# Patient Record
Sex: MALE | Race: WHITE | Employment: OTHER | URBAN - METROPOLITAN AREA
[De-identification: names, ages, dates, MRNs, and addresses within clinical notes are randomized per-mention and may not be internally consistent; named-entity substitution may affect disease eponyms.]

---

## 2018-10-10 ENCOUNTER — OFFICE VISIT (OUTPATIENT)
Dept: SURGERY | Age: 73
End: 2018-10-10

## 2018-10-10 VITALS
DIASTOLIC BLOOD PRESSURE: 71 MMHG | OXYGEN SATURATION: 96 % | TEMPERATURE: 97.3 F | SYSTOLIC BLOOD PRESSURE: 138 MMHG | RESPIRATION RATE: 16 BRPM | HEART RATE: 79 BPM | BODY MASS INDEX: 24.45 KG/M2 | HEIGHT: 69 IN | WEIGHT: 165.1 LBS

## 2018-10-10 DIAGNOSIS — M62.08 RECTUS DIASTASIS: ICD-10-CM

## 2018-10-10 DIAGNOSIS — I10 ESSENTIAL HYPERTENSION: ICD-10-CM

## 2018-10-10 DIAGNOSIS — K42.9 UMBILICAL HERNIA WITHOUT OBSTRUCTION AND WITHOUT GANGRENE: ICD-10-CM

## 2018-10-10 DIAGNOSIS — K43.9 VENTRAL HERNIA WITHOUT OBSTRUCTION OR GANGRENE: Primary | ICD-10-CM

## 2018-10-10 RX ORDER — LISINOPRIL 20 MG/1
TABLET ORAL
Refills: 1 | COMMUNITY
Start: 2018-09-19

## 2018-10-10 NOTE — MR AVS SNAPSHOT
[address], [address] [address] 
[phone] Patient: El Nagel MRN: [id number] Visit Information Date & Time Provider Department Dept. Phone Encounter #  
 10/10/2018  3:00 PM Kashif Slade MD [facility] [id number] Upcoming Health Maintenance Date Due Hepatitis C Screening 1945 DTaP/Tdap/Td series (1 - Tdap) 4/30/1967 Shingrix Vaccine Age 50> (1 of 2) 4/30/1996 FOBT Q 1 YEAR AGE 50-75 4/30/1996 GLAUCOMA SCREENING Q2Y 4/30/2011 Pneumococcal 65+ Low/Medium Risk (1 of 2 - PCV13) 4/30/2011 Influenza Age 5 to Adult 8/1/2018 MEDICARE YEARLY EXAM 10/3/2018 Allergies as of 10/10/2018  Review Complete On: 10/10/2018 By: Kashif Slade MD  
  
 Severity Noted Reaction Type Reactions Oxycodone  10/10/2018    Other (comments) Changes mental status,  Pt will not take. Current Immunizations  Never Reviewed No immunizations on file. Not reviewed this visit Vitals BP Pulse Temp Resp Height(growth percentile) Weight(growth percentile) 138/71 (BP 1 Location: Left arm, BP Patient Position: Sitting) 79 97.3 °F (36.3 °C) (Oral) 16 5' 9\" (1.753 m) 165 lb 1.6 oz (74.9 kg) SpO2 BMI Smoking Status 96% 24.38 kg/m2 Former Smoker Vitals History BMI and BSA Data Body Mass Index Body Surface Area  
 24.38 kg/m 2 1.91 m 2 Your Updated Medication List  
  
   
This list is accurate as of 10/10/18  4:50 PM.  Always use your most recent med list.  
  
  
  
  
 GLUCOSAMINE RELIEF 1,000 mg Tab Generic drug:  glucosamine Take 3,000 mg by mouth daily. lisinopril 20 mg tablet Commonly known as:  Ashley PENDLETON 1 T PO QD  
  
 lisinopril-hydroCHLOROthiazide 10-12.5 mg per tablet Commonly known as:  Luanne Jain  
 Take 1 Tab by mouth daily. Indications: HYPERTENSION  
  
 loratadine 10 mg Cap Take 10 mg by mouth daily. Indications: ALLERGIC RHINITIS  
  
 naproxen sodium 220 mg tablet Commonly known as:  NAPROSYN Take 220 mg by mouth two (2) times daily (with meals). Indications: PAIN  
  
 saw palmetto 160 mg Cap Take 160 mg by mouth two (2) times a day. therapeutic multivitamin tablet Commonly known as:  [facility] Take 1 Tab by mouth daily. Indications: VITAMIN DEFICIENCY Introducing [facility] SERVICES! Ugo Lees introduces [employer] patient portal. Now you can access parts of your medical record, email your doctor's office, and request medication refills online. 1. In your internet browser, go to [email or web address] 2. Click on the First Time User? Click Here link in the Sign In box. You will see the New Member Sign Up page. 3. Enter your [employer] Access Code exactly as it appears below. You will not need to use this code after youve completed the sign-up process. If you do not sign up before the expiration date, you must request a new code. · [employer] Access Code: [email or web address] Expires: 1/8/2019  3:17 PM 
 
4. Enter the last four digits of your Social Security Number (xxxx) and Date of Birth (mm/dd/yyyy) as indicated and click Submit. You will be taken to the next sign-up page. 5. Create a [employer] ID. This will be your [employer] login ID and cannot be changed, so think of one that is secure and easy to remember. 6. Create a [employer] password. You can change your password at any time. 7. Enter your Password Reset Question and Answer. This can be used at a later time if you forget your password. 8. Enter your e-mail address. You will receive e-mail notification when new information is available in [address] 9. Click Sign Up. You can now view and download portions of your medical record.  
10. Click the Download Summary menu link to download a portable copy of your medical information. If you have questions, please visit the Frequently Asked Questions section of the [employer] website. Remember, [employer] is NOT to be used for urgent needs. For medical emergencies, dial 911. Now available from your iPhone and Android! Please provide this summary of care documentation to your next provider. Your primary care clinician is listed as EREN Webster. If you have any questions after today's visit, please call [phone].
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
## 2018-10-10 NOTE — PROGRESS NOTES
Chief Complaint Patient presents with  Possible Hernia Seen at the request of Dr. Miranda Tolentino, eval hernia. 1. Have you been to the ER, urgent care clinic since your last visit? Hospitalized since your last visit? No 
 
2. Have you seen or consulted any other health care providers outside of the [address] [place] [address] since your last visit? Include any pap smears or colon screening. Chiropractor.

## 2018-12-07 ENCOUNTER — OFFICE VISIT (OUTPATIENT)
Dept: SURGERY | Age: 73
End: 2018-12-07

## 2018-12-07 VITALS
RESPIRATION RATE: 16 BRPM | TEMPERATURE: 96.5 F | DIASTOLIC BLOOD PRESSURE: 85 MMHG | SYSTOLIC BLOOD PRESSURE: 147 MMHG | WEIGHT: 178 LBS | HEIGHT: 69 IN | OXYGEN SATURATION: 97 % | HEART RATE: 81 BPM | BODY MASS INDEX: 26.36 KG/M2

## 2018-12-07 DIAGNOSIS — M62.08 RECTUS DIASTASIS: ICD-10-CM

## 2018-12-07 DIAGNOSIS — K42.9 UMBILICAL HERNIA WITHOUT OBSTRUCTION AND WITHOUT GANGRENE: ICD-10-CM

## 2018-12-07 DIAGNOSIS — K43.9 VENTRAL HERNIA WITHOUT OBSTRUCTION OR GANGRENE: Primary | ICD-10-CM

## 2018-12-07 RX ORDER — LANOLIN ALCOHOL/MO/W.PET/CERES
400 CREAM (GRAM) TOPICAL DAILY
COMMUNITY

## 2018-12-07 NOTE — PATIENT INSTRUCTIONS
Surgery Instruction Sheet You have been scheduled for surgery on 01/09/2019 at 8:15am at [facility]. Please report to the Surgery Center at 6:15am, this is approximately 2 hours prior to your surgery time. The Surgery Center is located on the [address] [place] [address] Street side of the hospital, just next to the Emergency Room. Reserved parking is available and  parking if lot is full. You will need to have a Pre-op Visit prior to your surgery. Report to the Surgery Center on 01/02/2019 at 9:00am.  Bring a list of medications and your insurance cards with you. You may eat/drink prior to this visit. Call your physician immediately if you notice a change in your health between the time you saw your physician and the day of surgery. If you take a blood thinner, please let us know. Call your ordering Doctor to make sure you can stop taking it prior to your surgery. STOP YOUR ASPIRIN 10 DAYS PRIOR TO SURGERY. DO NOT TAKE  IBUPROFEN, ADVIL, MOTRIN, ALEVE, EXCEDRIN, BC POWDER, GOODIES, FISH OIL OR ANY MEDICATION CONTAINING ASPIRIN 10 DAYS PRIOR TO YOUR SURGERY. MAY TAKE TYLENOL. Eat a light dinner the evening before your surgery. DO NOT EAT OR DRINK ANYTHING AFTER MIDNIGHT THE NIGHT BEFORE YOUR SURGERY. This includes water, chewing gum, lifesavers, etc.  The Pre op nurse will check with you about any medication that you may need to take the morning of surgery. Shower with a new bar of anti-bacterial soap (Dial, Safeguard) or solution given to you by Pre-op, the night before surgery. Do not use lotion, powder, deodorant on the skin after showering.   Wear loose, comfortable clothing the day of surgery and bring a container to store your contacts, eyeglasses, dentures, hearing aid, etc.  Do not bring money, valuables, jewelry, etc. to the hospital.   
 
If you are having outpatient surgery, someone must come with you the morning of surgery to drive you home. You can not drive for 24 hours after any anesthesia. Sometimes it is necessary to stay overnight and leave the next morning. This is still considered outpatient for most insurance deductibles. Someone will still need to drive you home. If you have questions or concerns, please feel free to call Dr Jana Perez at [phone]. If you need to cancel your surgery, please call as soon as possible.
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
## 2018-12-07 NOTE — PROGRESS NOTES
Chief Complaint Patient presents with  Ventral Hernia Patient here today to discuss surgery. 1. Have you been to the ER, urgent care clinic since your last visit? Hospitalized since your last visit? No 
 
2. Have you seen or consulted any other health care providers outside of the [address] [place] [address] since your last visit? Include any pap smears or colon screening.  No

## 2018-12-27 NOTE — PERIOP NOTES
Preventing Infections Before and After  Your Surgery    IMPORTANT INSTRUCTIONS    Please read and follow these instructions carefully. If you are unable to comply with the below instructions your procedure will be cancelled. Every Night for Three (3) nights before your surgery:  1. Shower with an antibacterial soap, such as Dial, or the soap provided at your preassessment appointment. A shower is better than a bath for cleaning your skin. 2. If needed, ask someone to help you reach all areas of your body. Dont forget to clean your belly button with every shower. The night before your surgery: If you lose your Hibiclens please contact surgery center or you can purchase it at a local pharmacy  1. On the night before your surgery, shower with an antibacterial soap, such as Dial, or the soap provided at your preassessment appointment. 2. With one packet of Hibiclens in hand, turn water off.  3. Apply Hibiclens antiseptic skin cleanser with a clean, freshly washed washcloth. ? Gently apply to your body from chin to toes (except the genital area) and especially the area(s) where your incision(s) will be. ? Leave Hibiclens on your skin for at least 20 seconds. CAUTION: If needed, Hibiclens may be used to clean the folds of skin of the legs (such as in the area of the groin) and on your buttocks and hips. However, do not use Hibiclens above the neck or in the genital area (your bottom) or put inside any area of your body. 4. Turn the water back on and rinse. 5. Dry gently with a clean, freshly washed towel. 6. After your shower, do not use any powder, deodorant, perfumes or lotion. 7. Use clean, freshly washed towels and washcloths every time you shower. 8. Wear clean, freshly washed pajamas to bed the night before surgery. 9. Sleep on clean, freshly washed sheets. 10. Do not allow pets to sleep in your bed with you. The Morning of your surgery:  1.  Shower again thoroughly with an antibacterial soap, such as Dial or the soap provided at your preassessment appointment. If needed, ask someone for help to reach all areas of your body. Dont forget to clean your belly button! Rinse. 2. Dry gently with a clean, freshly washed towel. 3. After your shower, do not use any powder, deodorant, perfumes or lotion prior to surgery. 4. Put on clean, freshly washed clothing. Tips to help prevent infections after your surgery:  1. Protect your surgical wound from germs:  ? Hand washing is the most important thing you and your caregivers can do to prevent infections. ? Keep your bandage clean and dry! ? Do not touch your surgical wound. 2. Use clean, freshly washed towels and washcloths every time you shower; do not share bath linens with others. 3. Until your surgical wound is healed, wear clothing and sleep on bed linens each day that are clean and freshly washed. 4. Do not allow pets to sleep in your bed with you or touch your surgical wound. 5. Do not smoke  smoking delays wound healing. This may be a good time to stop smoking. 6. If you have diabetes, it is important for you to manage your blood sugar levels properly before your surgery as well as after your surgery. Poorly managed blood sugar levels slow down wound healing and prevent you from healing completely. If you lose your Hibiclens, please call the [facility], or it is available for purchase at your pharmacy.                ___________________      ___________________      ________________  (Signature of Patient)          (Witness)                   (Date and Time)

## 2018-12-27 NOTE — PERIOP NOTES
[facility]  Ambulatory Surgery Unit  Pre-operative Instructions    Surgery/Procedure Date  Thursday, January 3, 2019            Tentative Arrival Time 0700      1. On the day of your surgery/procedure, please report to the Ambulatory Surgery Unit Registration Desk and sign in at your designated time. The Ambulatory Surgery Unit is located in [facility] on the [facility] side of the [facility] across from the [facility]. Please have all of your health insurance cards and a photo ID. 2. You must have someone with you to drive you home, as you should not drive a car for 24 hours following anesthesia. Please make arrangements for a responsible adult friend or family member to stay with you for at least the first 24 hours after your surgery. 3. Do not have anything to eat or drink (including water, gum, mints, coffee, juice) after 11:59 PM, Wednesday. This may not apply to medications prescribed by your physician. (Please note below the special instructions with medications to take the morning of surgery, if applicable.)    4. We recommend you do not drink any alcoholic beverages for 24 hours before and after your surgery. 5. Contact your surgeons office for instructions on the following medications: non-steroidal anti-inflammatory drugs (i.e. Advil, Aleve), vitamins, and supplements. (Some surgeons will want you to stop these medications prior to surgery and others may allow you to take them)   **If you are currently taking Plavix, Coumadin, Aspirin and/or other blood-thinning agents, contact your surgeon for instructions. ** Your surgeon will partner with the physician prescribing these medications to determine if it is safe to stop or if you need to continue taking. Please do not stop taking these medications without instructions from your surgeon. 6. See additional paperwork for washing instructions. 7. Wear comfortable clothes. Wear glasses instead of contacts.  Do not bring any jewelry or money (other than copays or fees as instructed). Do not wear make-up, particularly mascara, the morning of your surgery. Do not wear nail polish, particularly if you are having foot /hand surgery. Wear your hair loose or down, no ponytails, buns, norma pins or clips. All body piercings must be removed. 8. You should understand that if you do not follow these instructions your surgery may be cancelled. If your physical condition changes (i.e. fever, cold or flu) please contact your surgeon as soon as possible. 9. It is important that you be on time. If a situation occurs where you may be late, or if you have any questions or problems, please call [phone].    10. Your surgery time may be subject to change. You will receive a phone call the day prior to surgery to confirm your arrival time. 11. Pediatric patients: please bring a change of clothes, diapers, bottle/sippy cup, pacifier, etc.      Special Instructions: Take all medications and inhalers, as prescribed, on the morning of surgery with a sip of water. I understand a pre-operative phone call will be made to verify my surgery time. In the event that I am not available, I give permission for a message to be left on my answering service and/or with another person?       yes    Preop instructions reviewed  Pt verbalized understanding.      ___________________      ___________________      ________________  (Signature of Patient)          (Witness)                   (Date and Time)

## 2018-12-27 NOTE — PERIOP NOTES
Called Dr. Sandi Mccray' office and lm that pt has a history of MRSA. Pt also has a cold/laryngitis, no fever and not coughing up anything. I told him if it progresses or is not getting better he needs to reach out to us and reschedule. He verbalized understanding.

## 2019-01-02 ENCOUNTER — HOSPITAL ENCOUNTER (OUTPATIENT)
Dept: PREADMISSION TESTING | Age: 74
Discharge: HOME | End: 2019-01-02
Attending: SURGERY
Payer: MEDICARE

## 2019-01-02 ENCOUNTER — ANESTHESIA EVENT (OUTPATIENT)
Dept: SURGERY | Age: 74
End: 2019-01-02
Payer: MEDICARE

## 2019-01-02 VITALS
OXYGEN SATURATION: 99 % | SYSTOLIC BLOOD PRESSURE: 145 MMHG | TEMPERATURE: 97.9 F | HEIGHT: 69 IN | BODY MASS INDEX: 25.44 KG/M2 | DIASTOLIC BLOOD PRESSURE: 76 MMHG | RESPIRATION RATE: 20 BRPM | WEIGHT: 171.74 LBS

## 2019-01-02 LAB
ANION GAP SERPL CALC-SCNC: 7 MMOL/L (ref 5–15)
APPEARANCE UR: CLEAR
ATRIAL RATE: 80 BPM
BACTERIA URNS QL MICRO: ABNORMAL /HPF
BILIRUB UR QL: NEGATIVE
BUN SERPL-MCNC: 15 MG/DL (ref 6–20)
BUN/CREAT SERPL: 12 (ref 12–20)
CALCIUM SERPL-MCNC: 9 MG/DL (ref 8.5–10.1)
CALCULATED P AXIS, ECG09: 71 DEGREES
CALCULATED R AXIS, ECG10: 8 DEGREES
CALCULATED T AXIS, ECG11: 71 DEGREES
CHLORIDE SERPL-SCNC: 98 MMOL/L (ref 97–108)
CO2 SERPL-SCNC: 26 MMOL/L (ref 21–32)
COLOR UR: ABNORMAL
CREAT SERPL-MCNC: 1.28 MG/DL (ref 0.7–1.3)
DIAGNOSIS, 93000: NORMAL
EPITH CASTS URNS QL MICRO: ABNORMAL /LPF
ERYTHROCYTE [DISTWIDTH] IN BLOOD BY AUTOMATED COUNT: 13.9 % (ref 11.5–14.5)
GLUCOSE SERPL-MCNC: 93 MG/DL (ref 65–100)
GLUCOSE UR STRIP.AUTO-MCNC: NEGATIVE MG/DL
HCT VFR BLD AUTO: 41.4 % (ref 36.6–50.3)
HGB BLD-MCNC: 13.9 G/DL (ref 12.1–17)
HGB UR QL STRIP: NEGATIVE
HYALINE CASTS URNS QL MICRO: ABNORMAL /LPF (ref 0–5)
KETONES UR QL STRIP.AUTO: NEGATIVE MG/DL
LEUKOCYTE ESTERASE UR QL STRIP.AUTO: NEGATIVE
MCH RBC QN AUTO: 29 PG (ref 26–34)
MCHC RBC AUTO-ENTMCNC: 33.6 G/DL (ref 30–36.5)
MCV RBC AUTO: 86.3 FL (ref 80–99)
NITRITE UR QL STRIP.AUTO: NEGATIVE
NRBC # BLD: 0 K/UL (ref 0–0.01)
NRBC BLD-RTO: 0 PER 100 WBC
P-R INTERVAL, ECG05: 176 MS
PH UR STRIP: 6.5 [PH] (ref 5–8)
PLATELET # BLD AUTO: 280 K/UL (ref 150–400)
PMV BLD AUTO: 8.8 FL (ref 8.9–12.9)
POTASSIUM SERPL-SCNC: 4.2 MMOL/L (ref 3.5–5.1)
PROT UR STRIP-MCNC: NEGATIVE MG/DL
Q-T INTERVAL, ECG07: 376 MS
QRS DURATION, ECG06: 74 MS
QTC CALCULATION (BEZET), ECG08: 433 MS
RBC # BLD AUTO: 4.8 M/UL (ref 4.1–5.7)
RBC #/AREA URNS HPF: ABNORMAL /HPF (ref 0–5)
SODIUM SERPL-SCNC: 131 MMOL/L (ref 136–145)
SP GR UR REFRACTOMETRY: 1.01 (ref 1–1.03)
UA: UC IF INDICATED,UAUC: ABNORMAL
UROBILINOGEN UR QL STRIP.AUTO: 0.2 EU/DL (ref 0.2–1)
VENTRICULAR RATE, ECG03: 80 BPM
WBC # BLD AUTO: 8.8 K/UL (ref 4.1–11.1)
WBC URNS QL MICRO: ABNORMAL /HPF (ref 0–4)

## 2019-01-02 PROCEDURE — 93005 ELECTROCARDIOGRAM TRACING: CPT

## 2019-01-02 PROCEDURE — 85027 COMPLETE CBC AUTOMATED: CPT

## 2019-01-02 PROCEDURE — 80048 BASIC METABOLIC PNL TOTAL CA: CPT

## 2019-01-02 PROCEDURE — 81001 URINALYSIS AUTO W/SCOPE: CPT

## 2019-01-02 PROCEDURE — 36415 COLL VENOUS BLD VENIPUNCTURE: CPT

## 2019-01-02 PROCEDURE — 87086 URINE CULTURE/COLONY COUNT: CPT

## 2019-01-02 RX ORDER — BISMUTH SUBSALICYLATE 262 MG
1 TABLET,CHEWABLE ORAL DAILY
COMMUNITY

## 2019-01-02 RX ORDER — LUTEIN 6 MG
TABLET ORAL
COMMUNITY

## 2019-01-02 NOTE — ADVANCED PRACTICE NURSE
Results from PAT assessment reviewed. Na 131. Pt admits to hx of same. States his diuretic was discontinued for hyponatremia previously. Pt states he has had recent URI and has been increasing water intake to thin secretions. Recommended pt restrict fluid intake to 2337-8595 cc daily and add electrolyte solution instead of strictly water. Results faxed to PCP for further recommendations for pt. Confirmation of fax received.

## 2019-01-02 NOTE — PERIOP NOTES
[facility] Preoperative Instructions Surgery Date 01/09/19        Time of Arrival 2403 Contact # [phone] cell 1. On the day of your surgery, please report to the Surgical Services Registration Desk and sign in at your designated time. The Surgery Center is located to the right of the Emergency Room. 2. You must have someone with you to drive you home. You should not drive a car for 24 hours following surgery. Please make arrangements for a friend or family member to stay with you for the first 24 hours after your surgery. 3. Do not have anything to eat or drink (including water, gum, mints, coffee, juice) after midnight ?01/08/19  . ? This may not apply to medications prescribed by your physician. ?(Please note below the special instructions with medications to take the morning of your procedure.) 4. We recommend you do not drink any alcoholic beverages for 24 hours before and after your surgery. 5. Contact your surgeons office for instructions on the following medications: non-steroidal anti-inflammatory drugs (i.e. Advil, Aleve), vitamins, and supplements. (Some surgeons will want you to stop these medications prior to surgery and others may allow you to take them) **If you are currently taking Plavix, Coumadin, Aspirin and/or other blood-thinning agents, contact your surgeon for instructions. ** Your surgeon will partner with the physician prescribing these medications to determine if it is safe to stop or if you need to continue taking. Please do not stop taking these medications without instructions from your surgeon 6. Wear comfortable clothes. Wear glasses instead of contacts. Do not bring any money or jewelry. Please bring picture ID, insurance card, and any prearranged co-payment or hospital payment. Do not wear make-up, particularly mascara the morning of your surgery.   Do not wear nail polish, particularly if you are having foot /hand surgery. Wear your hair loose or down, no ponytails, buns, norma pins or clips. All body piercings must be removed. Please shower with antibacterial soap for three consecutive days before and on the morning of surgery, but do not apply any lotions, powders or deodorants after the shower on the day of surgery. Please use a fresh towels after each shower. Please sleep in clean clothes and change bed linens the night before surgery. Please do not shave for 48 hours prior to surgery. Shaving of the face is acceptable. 7. You should understand that if you do not follow these instructions your surgery may be cancelled. If your physical condition changes (I.e. fever, cold or flu) please contact your surgeon as soon as possible. 8. It is important that you be on time. If a situation occurs where you may be late, please call [phone] (OR Holding Area). 9. If you have any questions and or problems, please call [phone] (Pre-admission Testing). 10. Your surgery time may be subject to change. You will receive a phone call the evening prior if your time changes. 11.  If having outpatient surgery, you must have someone to drive you here, stay with you during the duration of your stay, and to drive you home at time of discharge. 12.   In an effort to improve the efficiency, privacy, and safety for all of our Pre-op patients visitors are not allowed in the Holding area. Once you arrive and are registered your family/visitors will be asked to remain in the waiting room. The Pre-op staff will get you from the Surgical Waiting Area and will explain to you and your family/visitors that the Pre-op phase is beginning. The staff will answer any questions and provide instructions for tracking of the patient, by use of the existing tracking number and color-coded status board in the waiting room.   At this time the staff will also ask for your designated spokesperson information in the event that the physician or staff need to provide an update or obtain any pertinent information. The designated spokesperson will be notified if the physician needs to speak to family during the pre-operative phase. If at any time your family/visitors has questions or concerns they may approach the volunteer desk in the waiting area for assistance. Special Instructions: MEDICATIONS TO TAKE THE MORNING OF SURGERY WITH A SIP OF WATER:allergy medication I understand a pre-operative phone call will be made to verify my surgery time. In the event that I am not available, I give permission for a message to be left on my answering service and/or with another person? yes  
 
 
 
 ___________________      __________   _________ 
  (Signature of Patient)             (Witness)                (Date and Time)

## 2019-01-03 ENCOUNTER — ANESTHESIA (OUTPATIENT)
Dept: SURGERY | Age: 74
End: 2019-01-03
Payer: MEDICARE

## 2019-01-03 ENCOUNTER — HOSPITAL ENCOUNTER (OUTPATIENT)
Age: 74
Setting detail: OUTPATIENT SURGERY
Discharge: HOME | End: 2019-01-03
Attending: ORTHOPAEDIC SURGERY | Admitting: ORTHOPAEDIC SURGERY
Payer: MEDICARE

## 2019-01-03 VITALS
RESPIRATION RATE: 20 BRPM | TEMPERATURE: 97.5 F | BODY MASS INDEX: 24.73 KG/M2 | HEIGHT: 69 IN | SYSTOLIC BLOOD PRESSURE: 158 MMHG | DIASTOLIC BLOOD PRESSURE: 62 MMHG | OXYGEN SATURATION: 99 % | HEART RATE: 86 BPM | WEIGHT: 167 LBS

## 2019-01-03 DIAGNOSIS — G89.18 POSTOPERATIVE PAIN OF EXTREMITY: Primary | ICD-10-CM

## 2019-01-03 DIAGNOSIS — M79.609 POSTOPERATIVE PAIN OF EXTREMITY: Primary | ICD-10-CM

## 2019-01-03 LAB
BACTERIA SPEC CULT: NORMAL
CC UR VC: NORMAL
SERVICE CMNT-IMP: NORMAL

## 2019-01-03 PROCEDURE — 74011250636 HC RX REV CODE- 250/636: Performed by: ORTHOPAEDIC SURGERY

## 2019-01-03 PROCEDURE — 74011250636 HC RX REV CODE- 250/636: Performed by: ANESTHESIOLOGY

## 2019-01-03 PROCEDURE — 74011250636 HC RX REV CODE- 250/636

## 2019-01-03 PROCEDURE — 77030039497 HC CST PAD STERILE CHCS -A: Performed by: ORTHOPAEDIC SURGERY

## 2019-01-03 PROCEDURE — 76060000064 HC AMB SURG ANES 2 TO 2.5 HR: Performed by: ORTHOPAEDIC SURGERY

## 2019-01-03 PROCEDURE — 77030034669 HC SUT FBRLP ARTH -B: Performed by: ORTHOPAEDIC SURGERY

## 2019-01-03 PROCEDURE — 76210000050 HC AMBSU PH II REC 0.5 TO 1 HR: Performed by: ORTHOPAEDIC SURGERY

## 2019-01-03 PROCEDURE — 77030031139 HC SUT VCRL2 J&J -A: Performed by: ORTHOPAEDIC SURGERY

## 2019-01-03 PROCEDURE — 77030018836 HC SOL IRR NACL ICUM -A: Performed by: ORTHOPAEDIC SURGERY

## 2019-01-03 PROCEDURE — 77030002916 HC SUT ETHLN J&J -A: Performed by: ORTHOPAEDIC SURGERY

## 2019-01-03 PROCEDURE — 77030000032 HC CUF TRNQT ZIMM -B: Performed by: ORTHOPAEDIC SURGERY

## 2019-01-03 PROCEDURE — 77030021352 HC CBL LD SYS DISP COVD -B: Performed by: ORTHOPAEDIC SURGERY

## 2019-01-03 PROCEDURE — 77030006689 HC BLD OPHTH BVR BD -A: Performed by: ORTHOPAEDIC SURGERY

## 2019-01-03 PROCEDURE — 77030003601 HC NDL NRV BLK BBMI -A: Performed by: NURSE ANESTHETIST, CERTIFIED REGISTERED

## 2019-01-03 PROCEDURE — 76210000034 HC AMBSU PH I REC 0.5 TO 1 HR: Performed by: ORTHOPAEDIC SURGERY

## 2019-01-03 PROCEDURE — 76030000004 HC AMB SURG OR TIME 2 TO 2.5: Performed by: ORTHOPAEDIC SURGERY

## 2019-01-03 PROCEDURE — 77030020255 HC SOL INJ LR 1000ML BG: Performed by: ORTHOPAEDIC SURGERY

## 2019-01-03 PROCEDURE — C1713 ANCHOR/SCREW BN/BN,TIS/BN: HCPCS | Performed by: ORTHOPAEDIC SURGERY

## 2019-01-03 DEVICE — IMPL SYS,CMC LGMNT RECON
Type: IMPLANTABLE DEVICE | Site: THUMB | Status: FUNCTIONAL
Brand: ARTHREX®

## 2019-01-03 RX ORDER — PROPOFOL 10 MG/ML
INJECTION, EMULSION INTRAVENOUS AS NEEDED
Status: DISCONTINUED | OUTPATIENT
Start: 2019-01-03 | End: 2019-01-03 | Stop reason: HOSPADM

## 2019-01-03 RX ORDER — SODIUM CHLORIDE 0.9 % (FLUSH) 0.9 %
5-10 SYRINGE (ML) INJECTION AS NEEDED
Status: DISCONTINUED | OUTPATIENT
Start: 2019-01-03 | End: 2019-01-03 | Stop reason: HOSPADM

## 2019-01-03 RX ORDER — MIDAZOLAM HYDROCHLORIDE 1 MG/ML
INJECTION, SOLUTION INTRAMUSCULAR; INTRAVENOUS
Status: DISCONTINUED
Start: 2019-01-03 | End: 2019-01-03 | Stop reason: HOSPADM

## 2019-01-03 RX ORDER — MIDAZOLAM HYDROCHLORIDE 1 MG/ML
INJECTION, SOLUTION INTRAMUSCULAR; INTRAVENOUS AS NEEDED
Status: DISCONTINUED | OUTPATIENT
Start: 2019-01-03 | End: 2019-01-03 | Stop reason: HOSPADM

## 2019-01-03 RX ORDER — SODIUM CHLORIDE, SODIUM LACTATE, POTASSIUM CHLORIDE, CALCIUM CHLORIDE 600; 310; 30; 20 MG/100ML; MG/100ML; MG/100ML; MG/100ML
25 INJECTION, SOLUTION INTRAVENOUS CONTINUOUS
Status: DISCONTINUED | OUTPATIENT
Start: 2019-01-03 | End: 2019-01-03 | Stop reason: HOSPADM

## 2019-01-03 RX ORDER — SODIUM CHLORIDE 0.9 % (FLUSH) 0.9 %
5-10 SYRINGE (ML) INJECTION EVERY 8 HOURS
Status: DISCONTINUED | OUTPATIENT
Start: 2019-01-03 | End: 2019-01-03 | Stop reason: HOSPADM

## 2019-01-03 RX ORDER — PROPOFOL 10 MG/ML
INJECTION, EMULSION INTRAVENOUS
Status: DISCONTINUED | OUTPATIENT
Start: 2019-01-03 | End: 2019-01-03 | Stop reason: HOSPADM

## 2019-01-03 RX ORDER — HYDROMORPHONE HYDROCHLORIDE 1 MG/ML
.2-.5 INJECTION, SOLUTION INTRAMUSCULAR; INTRAVENOUS; SUBCUTANEOUS ONCE
Status: DISCONTINUED | OUTPATIENT
Start: 2019-01-03 | End: 2019-01-03 | Stop reason: HOSPADM

## 2019-01-03 RX ORDER — HYDROCODONE BITARTRATE AND ACETAMINOPHEN 5; 325 MG/1; MG/1
1 TABLET ORAL
Qty: 30 TAB | Refills: 0 | Status: SHIPPED | OUTPATIENT
Start: 2019-01-03 | End: 2019-01-15

## 2019-01-03 RX ORDER — SODIUM CHLORIDE 9 MG/ML
INJECTION, SOLUTION INTRAVENOUS
Status: COMPLETED | OUTPATIENT
Start: 2019-01-03 | End: 2019-01-03

## 2019-01-03 RX ORDER — MORPHINE SULFATE 10 MG/ML
2 INJECTION, SOLUTION INTRAMUSCULAR; INTRAVENOUS
Status: DISCONTINUED | OUTPATIENT
Start: 2019-01-03 | End: 2019-01-03 | Stop reason: HOSPADM

## 2019-01-03 RX ORDER — ROPIVACAINE HYDROCHLORIDE 5 MG/ML
INJECTION, SOLUTION EPIDURAL; INFILTRATION; PERINEURAL
Status: COMPLETED
Start: 2019-01-03 | End: 2019-01-03

## 2019-01-03 RX ORDER — MIDAZOLAM HYDROCHLORIDE 1 MG/ML
INJECTION, SOLUTION INTRAMUSCULAR; INTRAVENOUS
Status: COMPLETED
Start: 2019-01-03 | End: 2019-01-03

## 2019-01-03 RX ORDER — ONDANSETRON 2 MG/ML
4 INJECTION INTRAMUSCULAR; INTRAVENOUS AS NEEDED
Status: DISCONTINUED | OUTPATIENT
Start: 2019-01-03 | End: 2019-01-03 | Stop reason: HOSPADM

## 2019-01-03 RX ORDER — LIDOCAINE HYDROCHLORIDE 10 MG/ML
0.1 INJECTION, SOLUTION EPIDURAL; INFILTRATION; INTRACAUDAL; PERINEURAL AS NEEDED
Status: DISCONTINUED | OUTPATIENT
Start: 2019-01-03 | End: 2019-01-03 | Stop reason: HOSPADM

## 2019-01-03 RX ORDER — CEFAZOLIN SODIUM/WATER 2 G/20 ML
2 SYRINGE (ML) INTRAVENOUS ONCE
Status: COMPLETED | OUTPATIENT
Start: 2019-01-03 | End: 2019-01-03

## 2019-01-03 RX ORDER — LIDOCAINE HYDROCHLORIDE 20 MG/ML
INJECTION, SOLUTION EPIDURAL; INFILTRATION; INTRACAUDAL; PERINEURAL AS NEEDED
Status: DISCONTINUED | OUTPATIENT
Start: 2019-01-03 | End: 2019-01-03 | Stop reason: HOSPADM

## 2019-01-03 RX ORDER — ROPIVACAINE HYDROCHLORIDE 5 MG/ML
INJECTION, SOLUTION EPIDURAL; INFILTRATION; PERINEURAL
Status: COMPLETED | OUTPATIENT
Start: 2019-01-03 | End: 2019-01-03

## 2019-01-03 RX ORDER — DIPHENHYDRAMINE HYDROCHLORIDE 50 MG/ML
12.5 INJECTION, SOLUTION INTRAMUSCULAR; INTRAVENOUS AS NEEDED
Status: DISCONTINUED | OUTPATIENT
Start: 2019-01-03 | End: 2019-01-03 | Stop reason: HOSPADM

## 2019-01-03 RX ORDER — HYDROCODONE BITARTRATE AND ACETAMINOPHEN 5; 325 MG/1; MG/1
1 TABLET ORAL AS NEEDED
Status: DISCONTINUED | OUTPATIENT
Start: 2019-01-03 | End: 2019-01-03 | Stop reason: HOSPADM

## 2019-01-03 RX ORDER — ROPIVACAINE HYDROCHLORIDE 5 MG/ML
INJECTION, SOLUTION EPIDURAL; INFILTRATION; PERINEURAL
Status: DISCONTINUED
Start: 2019-01-03 | End: 2019-01-03 | Stop reason: WASHOUT

## 2019-01-03 RX ORDER — FENTANYL CITRATE 50 UG/ML
25 INJECTION, SOLUTION INTRAMUSCULAR; INTRAVENOUS
Status: DISCONTINUED | OUTPATIENT
Start: 2019-01-03 | End: 2019-01-03 | Stop reason: HOSPADM

## 2019-01-03 RX ADMIN — MIDAZOLAM HYDROCHLORIDE 2 MG: 1 INJECTION, SOLUTION INTRAMUSCULAR; INTRAVENOUS at 10:28

## 2019-01-03 RX ADMIN — Medication 2 G: at 12:13

## 2019-01-03 RX ADMIN — ROPIVACAINE HYDROCHLORIDE 30 ML: 5 INJECTION, SOLUTION EPIDURAL; INFILTRATION; PERINEURAL at 10:36

## 2019-01-03 RX ADMIN — LIDOCAINE HYDROCHLORIDE 40 MG: 20 INJECTION, SOLUTION EPIDURAL; INFILTRATION; INTRACAUDAL; PERINEURAL at 12:09

## 2019-01-03 RX ADMIN — PROPOFOL 20 MG: 10 INJECTION, EMULSION INTRAVENOUS at 12:11

## 2019-01-03 RX ADMIN — PROPOFOL 100 MCG/KG/MIN: 10 INJECTION, EMULSION INTRAVENOUS at 12:11

## 2019-01-03 RX ADMIN — SODIUM CHLORIDE, SODIUM LACTATE, POTASSIUM CHLORIDE, AND CALCIUM CHLORIDE 25 ML/HR: 600; 310; 30; 20 INJECTION, SOLUTION INTRAVENOUS at 09:28

## 2019-01-03 RX ADMIN — PROPOFOL 20 MG: 10 INJECTION, EMULSION INTRAVENOUS at 12:09

## 2019-01-03 NOTE — ANESTHESIA PROCEDURE NOTES
Peripheral Block Start time: 1/3/2019 10:25 AM 
End time: 1/3/2019 10:36 AM 
Performed by: Eliel Pro MD 
Authorized by: Eliel Pro MD  
 
 
Pre-procedure: Indications: at surgeon's request, post-op pain management and primary anesthetic Preanesthetic Checklist: patient identified, risks and benefits discussed, site marked, timeout performed, anesthesia consent given and patient being monitored Timeout Time: 10:27 Block Type:  
Block Type:  Supraclavicular Laterality:  Right Monitoring:  Standard ASA monitoring, responsive to questions and oxygen Injection Technique:  Single shot Procedures: ultrasound guided Patient Position: supine Prep: chlorhexidine Location:  Supraclavicular Needle Type:  Stimuplex Needle Gauge:  22 G Needle Localization:  Ultrasound guidance Assessment: 
Number of attempts:  1 Injection Assessment:  Incremental injection every 5 mL, no paresthesia, ultrasound image on chart, local visualized surrounding nerve on ultrasound, negative aspiration for blood and no intravascular symptoms Patient tolerance:  Patient tolerated the procedure well with no immediate complications

## 2019-01-03 NOTE — ACP (ADVANCE CARE PLANNING)
Responded to patient request in ASU for assistance in completing advance medical directive. Patient had already completed Honoring Choices document and only needed witnesses. Made copy for patient's chart and returned original to him. His daughter, Dhruv Mccoy, is his health care agent.   Her telephone number is [phone]    TAYLA Robles, [facility], [facility] Paging Service  [phone]
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
## 2019-01-03 NOTE — PROGRESS NOTES
Spiritual Care Assessment/Progress Note [address] 
 
 
NAME: John Gao      MRN: [id number] AGE: 68 y.o. SEX: male Tenriism Affiliation: Stephane Gary  
Language: [place] 1/3/2019     Total Time (in minutes): 24 Spiritual Assessment begun in [facility] through conversation with: 
  
    [x]Patient        [] Family    [] Friend(s) Reason for Consult: Advance medical directive consult Spiritual beliefs: (Please include comment if needed) 
   [] Identifies with a marisela tradition:     
   [] Supported by a marisela community:        
   [] Claims no spiritual orientation:       
   [] Seeking spiritual identity:            
   [x] Adheres to an individual form of spirituality:       
   [] Not able to assess:                   
 
    
Identified resources for coping:  
   [x] Prayer                           
   [] Music                  [] Guided Imagery [x] Family/friends                 [] Pet visits [] Devotional reading                         [] Unknown 
   [] Other:                                       
 
 
Interventions offered during this visit: (See comments for more details) Patient Interventions: Advance medical directive completed, Iconic (affirming the presence of God/Higher Power), Initial/Spiritual assessment, patient floor, Affirmation of marisela Plan of Care: 
 
 [] Support spiritual and/or cultural needs  
 [] Support AMD and/or advance care planning process    
 [] Support grieving process 
 [] Coordinate Rites and/or Rituals  
 [] Coordination with community clergy 
 [x] No spiritual needs identified at this time 
 [] Detailed Plan of Care below (See Comments)  [] Make referral to Music Therapy 
[] Make referral to Pet Therapy    
[] Make referral to Addiction services 
[] Make referral to [facility] 
[] Make referral to Spiritual Care Partner 
[] No future visits requested       
[] Follow up visits as needed Comments:  Responded to patient request in ASU for assistance in completing advance medical directive. Patient had already completed Honoring Choices document and only needed witnesses. Made copy for patient's chart and returned original to him. His daughter, Stefania Burden, is his health care agent. Her telephone number is [phone] Patient had no spiritual needs or concerns Komal Edwards, TAYLA, [address] [facility],  [facility]  Paging Service  [phone])

## 2019-01-03 NOTE — OP NOTES
[address]  OPERATIVE REPORT    Lupe Brooks  MR#: [id number]  : 1945  ACCOUNT #: [de-identified]   DATE OF SERVICE: 2019    PREOPERATIVE DIAGNOSIS:  Basal joint arthritis, right thumb. POSTOPERATIVE DIAGNOSIS:  Basal joint arthritis, right thumb. PROCEDURE PERFORMED:  Right thumb basal joint arthroplasty with tendon transfer. SURGEON:  Lenny May MD    ASSISTANT:  none     ANESTHESIA:  Regional plus sedation. ESTIMATED BLOOD LOSS:  Minimal.    SPECIMENS REMOVED:  No specimens. COMPLICATIONS:  No complications. IMPLANTS:  The 4 x 10 mm Arthrex Bio-Tenodesis screw. SUMMARY:  The patient was brought into the operating room and placed on the operating room table in supine position and sedation administered. Note that the operative site had been identified, appropriate preoperative antibiotic prophylaxis administered and regional block administered in preop holding. The right upper extremity was prepped and draped in the usual manner. After a timeout, the limb was elevated, exsanguinated with Esmarch bandage and the tourniquet inflated to 250 mmHg. A transverse incision was made at the radial wrist, running approximately from the base of the second metacarpal to the FCR tendon volarly. Subcutaneous tissues were carefully divided, taking care to protect any sensory nerves encountered. The first dorsal compartment was identified, and the extensor pollicis brevis was taken dorsally and the abductor pollicis longus was taken volarly. Next, the radial artery was identified, dissected free from surrounding tissues, and protected throughout the procedure. A T-shaped capsular incision was made. The trapezium was exposed and removed piecemeal using osteotomes, rongeurs, a [place] blade and a 15 blade scalpel.   After all of the trapezium was removed, the FCR tendon was identified in the base of the wound and I placed traction on it and cut it where it entered the compartment, as proximal as possible. This tendon was then taken and a 2-0 FiberLoop suture was placed through the distal end. A guidewire for the drill was placed in the base of the metacarpal, perpendicular to the plane of the thumb. This was then overdrilled with a 4 mm drill. The tendon was then brought through the hole in the base of the metacarpal.  This gave excellent support to the base of the thumb metacarpal.  The 4 x 10 mm Bio-Tenodesis screw was placed, giving nice fixation to the metacarpal base. The wound was now thoroughly irrigated. Capsular closure was performed with 3-0 Vicryl. Skin was closed with 4-0 nylon. Xeroform, 4 x 4's, sterile cast padding, a plaster thumb spica splint, more cast padding and Coban were used as dressing. The tourniquet was released. Total tourniquet time was approximately 90 minutes. The patient tolerated the procedure well, was taken back to the PACU in stable condition.       MD NELI Johnson / CLARE  D: 01/03/2019 14:35     T: 01/03/2019 15:39  JOB #: [id number]

## 2019-01-03 NOTE — PERIOP NOTES
Dr. Eduardo Hodges performed a right supraclavicular block in preop using ultrasound machine. Pt on CM x3, 02 by NC at 3L, patient responsive when spoken to. Able to answer questions appropriately. Pt did receive 2 mg versed given by  for sedation. Pt tolerated procedure well. VSS and will continue to monitor

## 2019-01-03 NOTE — PERIOP NOTES
Permission received to review discharge instructions and discuss private health information with Melva Barrett (granddaughter).

## 2019-01-03 NOTE — DISCHARGE INSTRUCTIONS
Discharge Instructions for:    Jose Miguel / [id number] : 1945    Admitted 1/3/2019 Discharged: 1/3/2019       Postoperative Hand Surgery Instructions    For first hour when get home sit upright and take 5 deep breaths and hold to count of 5 and blow out every 15 minutes. Cough and clear lungs and repeat every hour until bedtime. Elevation:  Elevation is one of the most important things to do following your surgery. Not only does it decrease swelling, but it also decreases pain. For hand or wrist surgery, keep the arm in your sling while up and about, with your hand above your elbow. When lying down, keep your arm propped up on a few pillows, so that your fingers are pointing towards the ceiling. Finger Motion:  **It is very important that you begin moving your fingers (not your thumb) immediately after surgery, as often as you can, in order to prevent stiffness. Wiggling your fingers is not the same as moving them - moving your fingers involves bending them until they touch the dressing   (if possible). You should use your other hand to gently move the fingers on the operative hand if necessary. **DO NOT attempt to move your wrist.  You may gently wiggle the tip of your thumb. Dressings:  Please leave the surgical dressing in place until you are seen in the office for your first post-operative appointment with Dr Kedar Francis. The dressing helps to prevent infection and positions the extremity to protect the surgical procedure  that was performed. Bathing and showering are allowed as long as the dressing is kept dry. To keep your dressing dry while bathing, simply place a plastic bag (bread bag, newspaper bag, trash bag or subway sandwich bag) over the dressing and seal it with tape or a rubber band. Medications: You have likely been given a prescription for pain medication. Please follow the instructions closely.   It is safe to take up to 600mg of Ibuprofen (Advil or Motrin) along with the pain prescription as long you are not allergic to it, are not on blood thinners, and do not have gastric reflux or an ulcer. However, do not take any additional tylenol/acetaminophen if your prescription contains tylenol. Note that my policy is to give only one prescription for pain medication at the time of the surgery - if you use it up quickly, then you will have no more pain medication left after only a few days, and I will not give you another prescription. So use your pain medication sparingly, and only when necessary! If you have new or increasing numbness after any numbing medicine wears off (12-24 hours for regional blocks, 3 hours for local), call the office immediately. If you experience nausea, vomiting or itching with the pain medication, please call the office during regular office hours. Refills for pain medication prescriptions ARE NOT given on the weekend or after regular office hours. If antibiotics have been prescribed, please be sure to complete the entire prescription. Post-Operative Appointments:  Dr. Vivian Chung likes to see you back in the office about 10-14 days following your surgery to change the post-operative dressing and remove any necessary sutures or staples. If you have not received a follow up appointment card please contact our office at [phone]. *If you have any questions or concerns or experience any sudden changes in your condition, please call our office at [phone]*        DO NOT TAKE TYLENOL/ACETAMINOPHEN WITH PERCOCET, [address], [address] [address] [facility]. TAKE NARCOTIC PAIN MEDICATIONS WITH FOOD! For the night of surgery, while block is still in effect, start with 1 pain pill at bedtime    Narcotics tend to be constipating and we recommend taking a stool softener such as Colace or Miralax (follow package instructions). If you were given prescriptions, please review the written information on the prescribed medications.     DO NOT DRIVE WHILE TAKING NARCOTIC PAIN MEDICATIONS. CPAP PATIENTS BE SURE TO WEAR MACHINE WHENEVER NAPPING OR SLEEPING DAY/NIGHT OF SURGERY! DISCHARGE SUMMARY from Nurse    The following personal items collected during your admission are returned to you:   Dental Appliance: Dental Appliances: None  Vision: Visual Aid: Glasses, At home  Hearing Aid:    Jewelry: Jewelry: None  Clothing: Clothing: Other (comment)(clothing in belongings bag)  Other Valuables: Other Valuables: None  Valuables sent to safe:        PATIENT INSTRUCTIONS:    After General Anesthesia or Intravenous Sedation, for 24 hours or while taking prescription Narcotics:  · Someone should be with you for the next 24 hours. · For your own safety, a responsible adult must drive you home. · Limit your activities  · Recommended activity: Rest today, up with assistance today. Do not climb stairs or shower unattended for the next 24 hours. · Start with a soft bland diet and advance as tolerated (no nausea) to regular diet. · If you have a sore throat some things that may help are: fluids, warm salt water gargle, or throat lozenges. If this does not improve after several days please follow up with your family physician. · Do not drive and operate hazardous machinery  · Do not make important personal or business decisions  · Do  not drink alcoholic beverages  · If you have not urinated within 8 hours after discharge, please contact your surgeon on call.       Report the following to your surgeon:  · Excessive pain, swelling, redness or odor of or around the surgical area  · Temperature over 100.5  · Nausea and vomiting lasting longer than 4 hours or if unable to take medications  · Any signs of decreased circulation or nerve impairment to extremity: change in color, persistent  numbness, tingling, coldness or increase pain    · If you received an upper extremity nerve block, please wear your sling until the block has worn off, then refer to your surgeons post-operative instructions. If you have had a shoulder block or a block near your collar bone, you may have              symptoms such as:          1. Mild shortness of breath        2. A hoarse voice        3. Blurry vision        4. Unequal pupils        5. Drooping of your face on the same side as the nerve block. These symptoms will disappear as the nerve block wears off. ·      · You will receive a Post Operative Call from one of the Recovery Room Nurses on the day after your surgery to check on you. It is very important for us to know how you are recovering after your surgery. If you have an issue please call your surgeon, do not wait for the post operative call. · You may receive an e-mail or letter in the mail from [employer] regarding your experience with us in the Ambulatory Surgery Unit. Your feedback is valuable to us and we appreciate your participation in the survey. ·   · If the above instructions are not adequate or you are having problems after your surgery, call your physician at their office number. ·   · We wish youre a speedy recovery ? What to do at Home:    *  Please give a list of your current medications to your Primary Care Provider. *  Please update this list whenever your medications are discontinued, doses are      changed, or new medications (including over-the-counter products) are added. *  Please carry medication information at all times in case of emergency situations. These are general instructions for a healthy lifestyle:    No smoking/ No tobacco products/ Avoid exposure to second hand smoke    Surgeon General's Warning:  Quitting smoking now greatly reduces serious risk to your health.     Obesity, smoking, and sedentary lifestyle greatly increases your risk for illness    A healthy diet, regular physical exercise & weight monitoring are important for maintaining a healthy lifestyle    You may be retaining fluid if you have a history of heart failure or if you experience any of the following symptoms:  Weight gain of 3 pounds or more overnight or 5 pounds in a week, increased swelling in our hands or feet or shortness of breath while lying flat in bed. Please call your doctor as soon as you notice any of these symptoms; do not wait until your next office visit. Recognize signs and symptoms of STROKE:    B - Balance  E-  Eyes    F-  Face looks uneven  A-  Arms unable to move or move even  S-  Speech slurred or non-existent  T-  Time-call 911 as soon as signs and symptoms begin-DO NOT go       Back to bed or wait to see if you get better-TIME IS BRAIN. If you have not had your influenza or pneumococcal vaccines, please follow up with your primary care physician. The discharge information has been reviewed with the patient and caregiver. The patient and caregiver verbalized understanding. Mehul Hatfield THROMBOSIS AND PULMONARY EMBOLUS    SURGICAL PATIENTS  Surgical patients are the #1 risk for DVT and PE. WHAT IS DVT? WHAT IS PE?  DVT is a serious condition where blood clots develop deep in the veins of the legs. PE occurs when a blood clot breaks loose from the wall of a vein and travels to the lungs blocking the pulmonary artery or one of its branches impairing blood flow from the heart, which could result in death.   RISK FACTORS   Surgery lasting longer than 45 minutes   History of inflammatory bowel disease   Oral contraceptive or hormone replacement therapy   Immobilization   Varicose veins / swollen legs   Smoking    CHF / Acute MI / Irregular heart beat   Family history of thrombosis   General anesthesia greater than 2 hours   Obesity   Infection of less than one month   Less than 1 month postpartum   COPD / Pneumonia   Arthroscopic surgery   Malignancy / cancer   Spine surgery   Blood abnormalities   Stroke / Paralysis / Coma    SIGNS AND SYMPTOMS OF DEEP VEIN TROMBOSIS  Usually occurs in one leg, above or below the knee   Swelling - one calf or thigh may be larger than the other   Feeling increased warmth in the area of the leg that is swollen or painful   Leg pain, which may increase when standing or walking   Swelling along the vein of the leg   When swollen areas is pressed with a finger, a depression may remain   Tenderness of the leg that may be confined to one area   Change in leg color (bluish or red)    SIGNS AND SYMPTOMS OF PULMONARY EMBOLUS   Chest pain that gets worse with deep breath, coughing or chest movement   Coughing up blood   Sweating   Shortness of breath or difficulty breathing   Rapid heart beat   Lightheadedness    HOW TO REDUCE THE POSSIBLE RISK OF DVT   Exercise - simple activities as rotating ankles and wrists, wiggling toes and fingers, tightening and relaxing muscles in calves and thighs promotes circulation while recovering from surgery. Please do these exercises every hour during waking hours      Take mediation as prescribed by your physician (Lovenox, Coumadin, Aspirin)   Resume your normal activities as soon as your doctor advises you to do so.  Remember, when traveling, to [place] your legs frequently. PATIENTS WHO BELIEVE THEY MAY BE EXPERIENCING SIGNS AND SYMPTOMS OF DVT OR PE SHOULD SEEK MEDICAL HELP IMMEDIATELY      TO PREVENT AN INFECTION      1. [address] YOUR HANDS     To prevent infection, good handwashing is the most important thing you or your caregiver can do.  Wash your hands with soap and water or use the hand  we gave you before you touch any wounds. 2. SHOWER     Use the antibacterial soap we gave you when you take a shower.  Shower with this soap until your wounds are healed.  To reach all areas of your body, you may need someone to help you.  Dont forget to clean your belly button with every shower.     3.  USE CLEAN SHEETS     Use freshly cleaned sheets on your bed after surgery.  To keep the surgery site clean, do not allow pets to sleep with you while your wound is still healing.      

## 2019-01-03 NOTE — BRIEF OP NOTE
BRIEF OPERATIVE NOTE Date of Procedure: 1/3/2019 Preoperative Diagnosis: BASAL JOINT ARTHRITIS-RIGHT THUMB Postoperative Diagnosis: BASAL JOINT ARTHRITIS-RIGHT THUMB Procedure(s): RIGHT THUMB BASAL JOINT ARTHROPLASTY WITH TENDON TRANSFER (AX BLOCK) Surgeon(s) and Role: Richard Vieira MD - Primary Surgical Assistant: none Surgical Staff: 
Circ-1: Rosalba Caballero Scrub Tech-1: Joel Oro Event Time In Time Out Incision Start 1226 Incision Close 1416 Anesthesia: Other Estimated Blood Loss: min Specimens: * No specimens in log * Findings: BJ arthritis. Complications: None Implants:  
Implant Name Type Inv. Item Serial No.  Lot No. LRB No. Used [employer] SYS LIGAMT REP W/SCR 4X10MM -- [employer]  - [id number]  SYS LIGAMT REP W/SCR 4X10MM -- [facility] L[employer]   [employer] [id number] Right 1 Implanted

## 2019-01-03 NOTE — PROGRESS NOTES
Must stimulate to deep breath to keep Sa02 greater than 92%. Instructed pt on deep breaths-holding for 5 secs then blowing out. [id number] Pt. Alert. Denies pain or chill. Discharge instructions reviewed with caregiver and patient. Allowed and answered questions. Tolerating PO fluids. Both state ready for discharge. Pt maintaining Sa02 greater than 91% on room air without stimulation. Sister has instructions to deep breaths every 15 minutes for 1st hour then every hour. Pt has sleep apnea information. [phone] Discharged to car with sling.

## 2019-01-04 RX ORDER — CEFAZOLIN SODIUM/WATER 2 G/20 ML
2 SYRINGE (ML) INTRAVENOUS ONCE
Status: CANCELLED | OUTPATIENT
Start: 2019-01-09 | End: 2019-01-09

## 2019-01-09 ENCOUNTER — ANESTHESIA EVENT (OUTPATIENT)
Dept: SURGERY | Age: 74
DRG: 355 | End: 2019-01-09
Payer: MEDICARE

## 2019-01-09 ENCOUNTER — HOSPITAL ENCOUNTER (INPATIENT)
Age: 74
LOS: 2 days | Discharge: NURSING, REHAB OR LONG-TERM CARE | DRG: 355 | End: 2019-01-15
Attending: SURGERY | Admitting: SURGERY
Payer: MEDICARE

## 2019-01-09 ENCOUNTER — ANESTHESIA (OUTPATIENT)
Dept: SURGERY | Age: 74
DRG: 355 | End: 2019-01-09
Payer: MEDICARE

## 2019-01-09 DIAGNOSIS — K43.9 VENTRAL HERNIA WITHOUT OBSTRUCTION OR GANGRENE: Primary | ICD-10-CM

## 2019-01-09 LAB
ANION GAP BLD CALC-SCNC: 16 MMOL/L (ref 10–20)
BUN BLD-MCNC: 18 MG/DL (ref 9–20)
CA-I BLD-MCNC: 1.21 MMOL/L (ref 1.12–1.32)
CHLORIDE BLD-SCNC: 101 MMOL/L (ref 98–107)
CO2 BLD-SCNC: 25 MMOL/L (ref 21–32)
CREAT BLD-MCNC: 1.2 MG/DL (ref 0.6–1.3)
GLUCOSE BLD-MCNC: 89 MG/DL (ref 65–100)
HCT VFR BLD CALC: 42 % (ref 36.6–50.3)
POTASSIUM BLD-SCNC: 4 MMOL/L (ref 3.5–5.1)
SERVICE CMNT-IMP: ABNORMAL
SODIUM BLD-SCNC: 137 MMOL/L (ref 136–145)

## 2019-01-09 PROCEDURE — 74011000250 HC RX REV CODE- 250: Performed by: SURGERY

## 2019-01-09 PROCEDURE — 74011250636 HC RX REV CODE- 250/636

## 2019-01-09 PROCEDURE — 99218 HC RM OBSERVATION: CPT

## 2019-01-09 PROCEDURE — 77030008684 HC TU ET CUF COVD -B: Performed by: NURSE ANESTHETIST, CERTIFIED REGISTERED

## 2019-01-09 PROCEDURE — 77030021678 HC GLIDESCP STAT DISP VERT -B: Performed by: NURSE ANESTHETIST, CERTIFIED REGISTERED

## 2019-01-09 PROCEDURE — 74011250636 HC RX REV CODE- 250/636: Performed by: ANESTHESIOLOGY

## 2019-01-09 PROCEDURE — 77030020703 HC SEAL CANN DISP INTU -B: Performed by: SURGERY

## 2019-01-09 PROCEDURE — 77030016151 HC PROTCTR LNS DFOG COVD -B: Performed by: SURGERY

## 2019-01-09 PROCEDURE — 77030036554: Performed by: SURGERY

## 2019-01-09 PROCEDURE — 77030010351 HC TRCR ENDOSC VSTP COVD -B: Performed by: SURGERY

## 2019-01-09 PROCEDURE — 74011250636 HC RX REV CODE- 250/636: Performed by: SURGERY

## 2019-01-09 PROCEDURE — 8E0W4CZ ROBOTIC ASSISTED PROCEDURE OF TRUNK REGION, PERCUTANEOUS ENDOSCOPIC APPROACH: ICD-10-PCS | Performed by: SURGERY

## 2019-01-09 PROCEDURE — 77030032490 HC SLV COMPR SCD KNE COVD -B: Performed by: SURGERY

## 2019-01-09 PROCEDURE — 77030034849: Performed by: SURGERY

## 2019-01-09 PROCEDURE — 74011250636 HC RX REV CODE- 250/636: Performed by: NURSE PRACTITIONER

## 2019-01-09 PROCEDURE — 77030008771 HC TU NG SALEM SUMP -A: Performed by: NURSE ANESTHETIST, CERTIFIED REGISTERED

## 2019-01-09 PROCEDURE — 77030035277 HC OBTRTR BLDELSS DISP INTU -B: Performed by: SURGERY

## 2019-01-09 PROCEDURE — 76010000876 HC OR TIME 2 TO 2.5HR INTENSV - TIER 2: Performed by: SURGERY

## 2019-01-09 PROCEDURE — 76210000016 HC OR PH I REC 1 TO 1.5 HR: Performed by: SURGERY

## 2019-01-09 PROCEDURE — 74011000250 HC RX REV CODE- 250

## 2019-01-09 PROCEDURE — 77030035048 HC TRCR ENDOSC OPTCL COVD -B: Performed by: SURGERY

## 2019-01-09 PROCEDURE — C1781 MESH (IMPLANTABLE): HCPCS | Performed by: SURGERY

## 2019-01-09 PROCEDURE — 77030003581 HC NDL INSUF VERES COVD -B: Performed by: SURGERY

## 2019-01-09 PROCEDURE — 77030018846 HC SOL IRR STRL H20 ICUM -A: Performed by: SURGERY

## 2019-01-09 PROCEDURE — 77030011640 HC PAD GRND REM COVD -A: Performed by: SURGERY

## 2019-01-09 PROCEDURE — 77030031139 HC SUT VCRL2 J&J -A: Performed by: SURGERY

## 2019-01-09 PROCEDURE — 76060000035 HC ANESTHESIA 2 TO 2.5 HR: Performed by: SURGERY

## 2019-01-09 PROCEDURE — 77030039266 HC ADH SKN EXOFIN S2SG -A: Performed by: SURGERY

## 2019-01-09 PROCEDURE — 80047 BASIC METABLC PNL IONIZED CA: CPT

## 2019-01-09 PROCEDURE — 77030022704 HC SUT VLOC COVD -B: Performed by: SURGERY

## 2019-01-09 PROCEDURE — 0WUF4JZ SUPPLEMENT ABDOMINAL WALL WITH SYNTHETIC SUBSTITUTE, PERCUTANEOUS ENDOSCOPIC APPROACH: ICD-10-PCS | Performed by: SURGERY

## 2019-01-09 PROCEDURE — 74011250637 HC RX REV CODE- 250/637: Performed by: SURGERY

## 2019-01-09 PROCEDURE — 77030019908 HC STETH ESOPH SIMS -A: Performed by: NURSE ANESTHETIST, CERTIFIED REGISTERED

## 2019-01-09 DEVICE — MESH SURG W6XL8IN ELLIPSE W/ ECHO 2 POS SYS VENTRALIGHT: Type: IMPLANTABLE DEVICE | Site: ABDOMEN | Status: FUNCTIONAL

## 2019-01-09 RX ORDER — BUPIVACAINE HYDROCHLORIDE AND EPINEPHRINE 5; 5 MG/ML; UG/ML
INJECTION, SOLUTION EPIDURAL; INTRACAUDAL; PERINEURAL AS NEEDED
Status: DISCONTINUED | OUTPATIENT
Start: 2019-01-09 | End: 2019-01-09 | Stop reason: HOSPADM

## 2019-01-09 RX ORDER — TAMSULOSIN HYDROCHLORIDE 0.4 MG/1
0.4 CAPSULE ORAL DAILY
Qty: 30 CAP | Refills: 1 | Status: SHIPPED | OUTPATIENT
Start: 2019-01-09 | End: 2019-03-10

## 2019-01-09 RX ORDER — FENTANYL CITRATE 50 UG/ML
INJECTION, SOLUTION INTRAMUSCULAR; INTRAVENOUS
Status: COMPLETED
Start: 2019-01-09 | End: 2019-01-09

## 2019-01-09 RX ORDER — ENOXAPARIN SODIUM 100 MG/ML
40 INJECTION SUBCUTANEOUS EVERY 24 HOURS
Status: DISCONTINUED | OUTPATIENT
Start: 2019-01-09 | End: 2019-01-15 | Stop reason: HOSPADM

## 2019-01-09 RX ORDER — EPHEDRINE SULFATE 50 MG/ML
INJECTION, SOLUTION INTRAVENOUS AS NEEDED
Status: DISCONTINUED | OUTPATIENT
Start: 2019-01-09 | End: 2019-01-09 | Stop reason: HOSPADM

## 2019-01-09 RX ORDER — SODIUM CHLORIDE 0.9 % (FLUSH) 0.9 %
5-40 SYRINGE (ML) INJECTION AS NEEDED
Status: DISCONTINUED | OUTPATIENT
Start: 2019-01-09 | End: 2019-01-09 | Stop reason: HOSPADM

## 2019-01-09 RX ORDER — DEXTROSE, SODIUM CHLORIDE, AND POTASSIUM CHLORIDE 5; .45; .15 G/100ML; G/100ML; G/100ML
75 INJECTION INTRAVENOUS CONTINUOUS
Status: DISCONTINUED | OUTPATIENT
Start: 2019-01-09 | End: 2019-01-15

## 2019-01-09 RX ORDER — GLYCOPYRROLATE 0.2 MG/ML
INJECTION INTRAMUSCULAR; INTRAVENOUS AS NEEDED
Status: DISCONTINUED | OUTPATIENT
Start: 2019-01-09 | End: 2019-01-09 | Stop reason: HOSPADM

## 2019-01-09 RX ORDER — SODIUM CHLORIDE 0.9 % (FLUSH) 0.9 %
5-40 SYRINGE (ML) INJECTION EVERY 8 HOURS
Status: DISCONTINUED | OUTPATIENT
Start: 2019-01-09 | End: 2019-01-09 | Stop reason: HOSPADM

## 2019-01-09 RX ORDER — CEFAZOLIN SODIUM/WATER 2 G/20 ML
2 SYRINGE (ML) INTRAVENOUS ONCE
Status: COMPLETED | OUTPATIENT
Start: 2019-01-09 | End: 2019-01-09

## 2019-01-09 RX ORDER — LISINOPRIL 20 MG/1
20 TABLET ORAL DAILY
Status: DISCONTINUED | OUTPATIENT
Start: 2019-01-09 | End: 2019-01-10 | Stop reason: SDUPTHER

## 2019-01-09 RX ORDER — NEOSTIGMINE METHYLSULFATE 1 MG/ML
INJECTION INTRAVENOUS AS NEEDED
Status: DISCONTINUED | OUTPATIENT
Start: 2019-01-09 | End: 2019-01-09 | Stop reason: HOSPADM

## 2019-01-09 RX ORDER — MIDAZOLAM HYDROCHLORIDE 1 MG/ML
0.5 INJECTION, SOLUTION INTRAMUSCULAR; INTRAVENOUS
Status: DISCONTINUED | OUTPATIENT
Start: 2019-01-09 | End: 2019-01-09 | Stop reason: HOSPADM

## 2019-01-09 RX ORDER — ESMOLOL HYDROCHLORIDE 10 MG/ML
INJECTION INTRAVENOUS AS NEEDED
Status: DISCONTINUED | OUTPATIENT
Start: 2019-01-09 | End: 2019-01-09 | Stop reason: HOSPADM

## 2019-01-09 RX ORDER — FENTANYL CITRATE 50 UG/ML
25 INJECTION, SOLUTION INTRAMUSCULAR; INTRAVENOUS
Status: COMPLETED | OUTPATIENT
Start: 2019-01-09 | End: 2019-01-09

## 2019-01-09 RX ORDER — SUCCINYLCHOLINE CHLORIDE 20 MG/ML
INJECTION INTRAMUSCULAR; INTRAVENOUS AS NEEDED
Status: DISCONTINUED | OUTPATIENT
Start: 2019-01-09 | End: 2019-01-09 | Stop reason: HOSPADM

## 2019-01-09 RX ORDER — HYDROMORPHONE HYDROCHLORIDE 2 MG/ML
.5-1 INJECTION, SOLUTION INTRAMUSCULAR; INTRAVENOUS; SUBCUTANEOUS
Status: DISCONTINUED | OUTPATIENT
Start: 2019-01-09 | End: 2019-01-12

## 2019-01-09 RX ORDER — SODIUM CHLORIDE, SODIUM LACTATE, POTASSIUM CHLORIDE, CALCIUM CHLORIDE 600; 310; 30; 20 MG/100ML; MG/100ML; MG/100ML; MG/100ML
25 INJECTION, SOLUTION INTRAVENOUS CONTINUOUS
Status: DISCONTINUED | OUTPATIENT
Start: 2019-01-09 | End: 2019-01-09 | Stop reason: HOSPADM

## 2019-01-09 RX ORDER — LIDOCAINE HYDROCHLORIDE 20 MG/ML
INJECTION, SOLUTION EPIDURAL; INFILTRATION; INTRACAUDAL; PERINEURAL AS NEEDED
Status: DISCONTINUED | OUTPATIENT
Start: 2019-01-09 | End: 2019-01-09 | Stop reason: HOSPADM

## 2019-01-09 RX ORDER — FENTANYL CITRATE 50 UG/ML
INJECTION, SOLUTION INTRAMUSCULAR; INTRAVENOUS AS NEEDED
Status: DISCONTINUED | OUTPATIENT
Start: 2019-01-09 | End: 2019-01-09 | Stop reason: HOSPADM

## 2019-01-09 RX ORDER — ACETAMINOPHEN 10 MG/ML
INJECTION, SOLUTION INTRAVENOUS AS NEEDED
Status: DISCONTINUED | OUTPATIENT
Start: 2019-01-09 | End: 2019-01-09 | Stop reason: HOSPADM

## 2019-01-09 RX ORDER — MIDAZOLAM HYDROCHLORIDE 1 MG/ML
1 INJECTION, SOLUTION INTRAMUSCULAR; INTRAVENOUS AS NEEDED
Status: DISCONTINUED | OUTPATIENT
Start: 2019-01-09 | End: 2019-01-09 | Stop reason: HOSPADM

## 2019-01-09 RX ORDER — DEXAMETHASONE SODIUM PHOSPHATE 4 MG/ML
INJECTION, SOLUTION INTRA-ARTICULAR; INTRALESIONAL; INTRAMUSCULAR; INTRAVENOUS; SOFT TISSUE AS NEEDED
Status: DISCONTINUED | OUTPATIENT
Start: 2019-01-09 | End: 2019-01-09 | Stop reason: HOSPADM

## 2019-01-09 RX ORDER — MORPHINE SULFATE 10 MG/ML
2 INJECTION, SOLUTION INTRAMUSCULAR; INTRAVENOUS
Status: DISCONTINUED | OUTPATIENT
Start: 2019-01-09 | End: 2019-01-09 | Stop reason: HOSPADM

## 2019-01-09 RX ORDER — SODIUM CHLORIDE 9 MG/ML
50 INJECTION, SOLUTION INTRAVENOUS CONTINUOUS
Status: DISCONTINUED | OUTPATIENT
Start: 2019-01-09 | End: 2019-01-09 | Stop reason: HOSPADM

## 2019-01-09 RX ORDER — TAMSULOSIN HYDROCHLORIDE 0.4 MG/1
0.4 CAPSULE ORAL DAILY
Status: DISCONTINUED | OUTPATIENT
Start: 2019-01-09 | End: 2019-01-15 | Stop reason: HOSPADM

## 2019-01-09 RX ORDER — HYDROMORPHONE HYDROCHLORIDE 1 MG/ML
.5-1 INJECTION, SOLUTION INTRAMUSCULAR; INTRAVENOUS; SUBCUTANEOUS
Status: DISCONTINUED | OUTPATIENT
Start: 2019-01-09 | End: 2019-01-09

## 2019-01-09 RX ORDER — LIDOCAINE HYDROCHLORIDE 10 MG/ML
0.1 INJECTION, SOLUTION EPIDURAL; INFILTRATION; INTRACAUDAL; PERINEURAL AS NEEDED
Status: DISCONTINUED | OUTPATIENT
Start: 2019-01-09 | End: 2019-01-09 | Stop reason: HOSPADM

## 2019-01-09 RX ORDER — HYDROCODONE BITARTRATE AND ACETAMINOPHEN 5; 325 MG/1; MG/1
1 TABLET ORAL
Status: DISCONTINUED | OUTPATIENT
Start: 2019-01-09 | End: 2019-01-15 | Stop reason: HOSPADM

## 2019-01-09 RX ORDER — HYDROMORPHONE HYDROCHLORIDE 1 MG/ML
0.2 INJECTION, SOLUTION INTRAMUSCULAR; INTRAVENOUS; SUBCUTANEOUS
Status: DISCONTINUED | OUTPATIENT
Start: 2019-01-09 | End: 2019-01-09 | Stop reason: HOSPADM

## 2019-01-09 RX ORDER — DIPHENHYDRAMINE HYDROCHLORIDE 50 MG/ML
12.5 INJECTION, SOLUTION INTRAMUSCULAR; INTRAVENOUS AS NEEDED
Status: DISCONTINUED | OUTPATIENT
Start: 2019-01-09 | End: 2019-01-09 | Stop reason: HOSPADM

## 2019-01-09 RX ORDER — ONDANSETRON 4 MG/1
4 TABLET, ORALLY DISINTEGRATING ORAL
Status: DISCONTINUED | OUTPATIENT
Start: 2019-01-09 | End: 2019-01-15 | Stop reason: HOSPADM

## 2019-01-09 RX ORDER — MIDAZOLAM HYDROCHLORIDE 1 MG/ML
INJECTION, SOLUTION INTRAMUSCULAR; INTRAVENOUS AS NEEDED
Status: DISCONTINUED | OUTPATIENT
Start: 2019-01-09 | End: 2019-01-09 | Stop reason: HOSPADM

## 2019-01-09 RX ORDER — ROCURONIUM BROMIDE 10 MG/ML
INJECTION, SOLUTION INTRAVENOUS AS NEEDED
Status: DISCONTINUED | OUTPATIENT
Start: 2019-01-09 | End: 2019-01-09 | Stop reason: HOSPADM

## 2019-01-09 RX ORDER — KETOROLAC TROMETHAMINE 30 MG/ML
30 INJECTION, SOLUTION INTRAMUSCULAR; INTRAVENOUS
Status: DISCONTINUED | OUTPATIENT
Start: 2019-01-09 | End: 2019-01-09

## 2019-01-09 RX ORDER — NALOXONE HYDROCHLORIDE 0.4 MG/ML
0.4 INJECTION, SOLUTION INTRAMUSCULAR; INTRAVENOUS; SUBCUTANEOUS AS NEEDED
Status: DISCONTINUED | OUTPATIENT
Start: 2019-01-09 | End: 2019-01-15 | Stop reason: HOSPADM

## 2019-01-09 RX ORDER — SODIUM CHLORIDE 0.9 % (FLUSH) 0.9 %
5-40 SYRINGE (ML) INJECTION AS NEEDED
Status: DISCONTINUED | OUTPATIENT
Start: 2019-01-09 | End: 2019-01-15 | Stop reason: HOSPADM

## 2019-01-09 RX ORDER — FENTANYL CITRATE 50 UG/ML
50 INJECTION, SOLUTION INTRAMUSCULAR; INTRAVENOUS AS NEEDED
Status: DISCONTINUED | OUTPATIENT
Start: 2019-01-09 | End: 2019-01-09 | Stop reason: HOSPADM

## 2019-01-09 RX ORDER — ONDANSETRON 2 MG/ML
4 INJECTION INTRAMUSCULAR; INTRAVENOUS AS NEEDED
Status: DISCONTINUED | OUTPATIENT
Start: 2019-01-09 | End: 2019-01-09 | Stop reason: HOSPADM

## 2019-01-09 RX ORDER — SODIUM CHLORIDE 0.9 % (FLUSH) 0.9 %
5-40 SYRINGE (ML) INJECTION EVERY 8 HOURS
Status: DISCONTINUED | OUTPATIENT
Start: 2019-01-09 | End: 2019-01-15 | Stop reason: HOSPADM

## 2019-01-09 RX ORDER — ONDANSETRON 2 MG/ML
INJECTION INTRAMUSCULAR; INTRAVENOUS AS NEEDED
Status: DISCONTINUED | OUTPATIENT
Start: 2019-01-09 | End: 2019-01-09 | Stop reason: HOSPADM

## 2019-01-09 RX ORDER — SODIUM CHLORIDE, SODIUM LACTATE, POTASSIUM CHLORIDE, CALCIUM CHLORIDE 600; 310; 30; 20 MG/100ML; MG/100ML; MG/100ML; MG/100ML
75 INJECTION, SOLUTION INTRAVENOUS CONTINUOUS
Status: DISCONTINUED | OUTPATIENT
Start: 2019-01-09 | End: 2019-01-09 | Stop reason: HOSPADM

## 2019-01-09 RX ORDER — PHENYLEPHRINE HCL IN 0.9% NACL 0.4MG/10ML
SYRINGE (ML) INTRAVENOUS AS NEEDED
Status: DISCONTINUED | OUTPATIENT
Start: 2019-01-09 | End: 2019-01-09 | Stop reason: HOSPADM

## 2019-01-09 RX ORDER — PROPOFOL 10 MG/ML
INJECTION, EMULSION INTRAVENOUS AS NEEDED
Status: DISCONTINUED | OUTPATIENT
Start: 2019-01-09 | End: 2019-01-09 | Stop reason: HOSPADM

## 2019-01-09 RX ORDER — HYDROCODONE BITARTRATE AND ACETAMINOPHEN 5; 325 MG/1; MG/1
1 TABLET ORAL
Qty: 20 TAB | Refills: 0 | Status: SHIPPED | OUTPATIENT
Start: 2019-01-09 | End: 2019-01-14

## 2019-01-09 RX ADMIN — SODIUM CHLORIDE, SODIUM LACTATE, POTASSIUM CHLORIDE, AND CALCIUM CHLORIDE 75 ML/HR: 600; 310; 30; 20 INJECTION, SOLUTION INTRAVENOUS at 07:18

## 2019-01-09 RX ADMIN — SODIUM CHLORIDE, POTASSIUM CHLORIDE, SODIUM LACTATE AND CALCIUM CHLORIDE: 600; 310; 30; 20 INJECTION, SOLUTION INTRAVENOUS at 08:13

## 2019-01-09 RX ADMIN — HYDROMORPHONE HYDROCHLORIDE 0.2 MG: 1 INJECTION, SOLUTION INTRAMUSCULAR; INTRAVENOUS; SUBCUTANEOUS at 11:29

## 2019-01-09 RX ADMIN — HYDROMORPHONE HYDROCHLORIDE 0.2 MG: 1 INJECTION, SOLUTION INTRAMUSCULAR; INTRAVENOUS; SUBCUTANEOUS at 11:11

## 2019-01-09 RX ADMIN — NEOSTIGMINE METHYLSULFATE 3 MG: 1 INJECTION INTRAVENOUS at 10:05

## 2019-01-09 RX ADMIN — ROCURONIUM BROMIDE 10 MG: 10 INJECTION, SOLUTION INTRAVENOUS at 09:05

## 2019-01-09 RX ADMIN — PROPOFOL 130 MG: 10 INJECTION, EMULSION INTRAVENOUS at 08:21

## 2019-01-09 RX ADMIN — ESMOLOL HYDROCHLORIDE 30 MG: 10 INJECTION INTRAVENOUS at 10:05

## 2019-01-09 RX ADMIN — ONDANSETRON 4 MG: 4 TABLET, ORALLY DISINTEGRATING ORAL at 16:48

## 2019-01-09 RX ADMIN — DEXTROSE MONOHYDRATE, SODIUM CHLORIDE, AND POTASSIUM CHLORIDE 75 ML/HR: 50; 4.5; 1.49 INJECTION, SOLUTION INTRAVENOUS at 10:36

## 2019-01-09 RX ADMIN — FENTANYL CITRATE 25 MCG: 50 INJECTION, SOLUTION INTRAMUSCULAR; INTRAVENOUS at 10:02

## 2019-01-09 RX ADMIN — HYDROMORPHONE HYDROCHLORIDE 1 MG: 2 INJECTION, SOLUTION INTRAMUSCULAR; INTRAVENOUS; SUBCUTANEOUS at 13:42

## 2019-01-09 RX ADMIN — HYDROMORPHONE HYDROCHLORIDE 0.2 MG: 1 INJECTION, SOLUTION INTRAMUSCULAR; INTRAVENOUS; SUBCUTANEOUS at 12:37

## 2019-01-09 RX ADMIN — FENTANYL CITRATE 25 MCG: 50 INJECTION, SOLUTION INTRAMUSCULAR; INTRAVENOUS at 08:21

## 2019-01-09 RX ADMIN — FENTANYL CITRATE 50 MCG: 50 INJECTION, SOLUTION INTRAMUSCULAR; INTRAVENOUS at 09:39

## 2019-01-09 RX ADMIN — DEXAMETHASONE SODIUM PHOSPHATE 6 MG: 4 INJECTION, SOLUTION INTRA-ARTICULAR; INTRALESIONAL; INTRAMUSCULAR; INTRAVENOUS; SOFT TISSUE at 08:21

## 2019-01-09 RX ADMIN — ROCURONIUM BROMIDE 25 MG: 10 INJECTION, SOLUTION INTRAVENOUS at 08:33

## 2019-01-09 RX ADMIN — MIDAZOLAM HYDROCHLORIDE 1 MG: 1 INJECTION, SOLUTION INTRAMUSCULAR; INTRAVENOUS at 08:13

## 2019-01-09 RX ADMIN — FENTANYL CITRATE 25 MCG: 50 INJECTION, SOLUTION INTRAMUSCULAR; INTRAVENOUS at 10:07

## 2019-01-09 RX ADMIN — EPHEDRINE SULFATE 10 MG: 50 INJECTION, SOLUTION INTRAVENOUS at 08:42

## 2019-01-09 RX ADMIN — Medication 10 ML: at 13:47

## 2019-01-09 RX ADMIN — FENTANYL CITRATE 25 MCG: 50 INJECTION, SOLUTION INTRAMUSCULAR; INTRAVENOUS at 10:46

## 2019-01-09 RX ADMIN — FENTANYL CITRATE 25 MCG: 50 INJECTION, SOLUTION INTRAMUSCULAR; INTRAVENOUS at 08:48

## 2019-01-09 RX ADMIN — GLYCOPYRROLATE 0.4 MG: 0.2 INJECTION INTRAMUSCULAR; INTRAVENOUS at 10:05

## 2019-01-09 RX ADMIN — ACETAMINOPHEN 1000 MG: 10 INJECTION, SOLUTION INTRAVENOUS at 09:12

## 2019-01-09 RX ADMIN — LISINOPRIL 20 MG: 20 TABLET ORAL at 15:21

## 2019-01-09 RX ADMIN — FENTANYL CITRATE 25 MCG: 50 INJECTION, SOLUTION INTRAMUSCULAR; INTRAVENOUS at 10:28

## 2019-01-09 RX ADMIN — HYDROMORPHONE HYDROCHLORIDE 1 MG: 2 INJECTION, SOLUTION INTRAMUSCULAR; INTRAVENOUS; SUBCUTANEOUS at 22:03

## 2019-01-09 RX ADMIN — Medication 40 MCG: at 09:13

## 2019-01-09 RX ADMIN — ONDANSETRON 4 MG: 2 INJECTION INTRAMUSCULAR; INTRAVENOUS at 09:12

## 2019-01-09 RX ADMIN — FENTANYL CITRATE 25 MCG: 50 INJECTION, SOLUTION INTRAMUSCULAR; INTRAVENOUS at 10:55

## 2019-01-09 RX ADMIN — ROCURONIUM BROMIDE 10 MG: 10 INJECTION, SOLUTION INTRAVENOUS at 09:39

## 2019-01-09 RX ADMIN — VANCOMYCIN HYDROCHLORIDE 1000 MG: 1 INJECTION, POWDER, LYOPHILIZED, FOR SOLUTION INTRAVENOUS at 07:18

## 2019-01-09 RX ADMIN — ENOXAPARIN SODIUM 40 MG: 40 INJECTION SUBCUTANEOUS at 22:04

## 2019-01-09 RX ADMIN — LIDOCAINE HYDROCHLORIDE 80 MG: 20 INJECTION, SOLUTION EPIDURAL; INFILTRATION; INTRACAUDAL; PERINEURAL at 08:21

## 2019-01-09 RX ADMIN — FENTANYL CITRATE 50 MCG: 50 INJECTION, SOLUTION INTRAMUSCULAR; INTRAVENOUS at 08:13

## 2019-01-09 RX ADMIN — HYDROMORPHONE HYDROCHLORIDE 1 MG: 2 INJECTION, SOLUTION INTRAMUSCULAR; INTRAVENOUS; SUBCUTANEOUS at 18:02

## 2019-01-09 RX ADMIN — SUCCINYLCHOLINE CHLORIDE 140 MG: 20 INJECTION INTRAMUSCULAR; INTRAVENOUS at 08:21

## 2019-01-09 RX ADMIN — HYDROMORPHONE HYDROCHLORIDE 0.2 MG: 1 INJECTION, SOLUTION INTRAMUSCULAR; INTRAVENOUS; SUBCUTANEOUS at 10:52

## 2019-01-09 RX ADMIN — HYDROCODONE BITARTRATE AND ACETAMINOPHEN 1 TABLET: 5; 325 TABLET ORAL at 15:21

## 2019-01-09 RX ADMIN — FENTANYL CITRATE 25 MCG: 50 INJECTION, SOLUTION INTRAMUSCULAR; INTRAVENOUS at 10:34

## 2019-01-09 RX ADMIN — Medication 2 G: at 08:27

## 2019-01-09 RX ADMIN — Medication 80 MCG: at 08:37
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
## 2019-01-09 NOTE — PROGRESS NOTES
Reason for Admission:   Ventral and umbilical hernia RRAT Score:    5 Plan for utilizing home health:  Pt will need to utilize [facility] services for chiang care. Pt is in agreement with this. Likelihood of Readmission:  Low Transition of Care Plan:    CM met with pt to discuss d/c planning. Pt sister, Josh Green at bedside. CM explained role, pt verbalized understanding. CM verified demographic insurance and pcp information of pt. Pt resides alone  in one story home , but will go to his daughters home at discharge. Daughters address is [facility] [place], [place], [address] [place] [address]. Pt has prior [facility] experience in another state and no SNF or IP rehab experience. Pt able to complete ADLs/IADLs and drives. Pt PCP is Dr. Meeta Castro. Pt last seen by PCP approximately 3 weeks ago. Pt has supportive family and his sister will transport home at d/c.  
 
CM explained recommendation for [facility] at discharge. Pt in agreement. Pt provided with [facility] agency list to review. CM explained [address] doc and pt signed. FOC placed in chart. Pt will select 2 agencies for [facility] and CM will put in referral.  
 
CM will continue to follow pt for d/c planning. Care Management Interventions PCP Verified by CM: Yes Mode of Transport at Discharge: Other (see comment) Transition of Care Consult (CM Consult): Discharge Planning Discharge Durable Medical Equipment: No 
Current Support Network: Family Lives [facility] Confirm Follow Up Transport: Family Plan discussed with Pt/Family/Caregiver: Yes Freedom of Choice Offered: Yes Discharge Location Discharge Placement: Home with home health Pearl Obrien, Care Manager [phone]

## 2019-01-09 NOTE — ANESTHESIA POSTPROCEDURE EVALUATION
Procedure(s): 
ROBOTIC ASSISTED VENTRAL AND UMBILICAL HERNIA REPAIR WITH MESH. Anesthesia Post Evaluation Patient location during evaluation: PACU Patient participation: complete - patient participated Level of consciousness: awake and alert Pain management: adequate Airway patency: patent Anesthetic complications: no 
Cardiovascular status: acceptable Respiratory status: acceptable Hydration status: acceptable Comments: Seen and ready for discharge Post anesthesia nausea and vomiting:  none Visit Vitals /77 Pulse 79 Temp 36.7 °C (98 °F) Resp 20 Ht 5' 9\" (1.753 m) Wt 76.1 kg (167 lb 12.3 oz) SpO2 96% BMI 24.78 kg/m²

## 2019-01-09 NOTE — DISCHARGE INSTRUCTIONS
Discharge Instructions:  Hernia Repair    Dr. Sofía Rowe    Call for appointment for follow up in 3 weeks [phone]    Activity:    Walk regularly. No lifting more than 5 to 10 pounds for 6 weeks. Light aerobic activity is okay when you feel up to it. You may resume driving in five days unless still requiring narcotics for pain. Work:    You may return to work in 2 or 3 weeks to light activity. No lifting more than 5 pounds for four weeks and no more than 10 pounds for an additional 2 weeks. Diet:    You may resume normal diet after 24 hours. Anesthesia and narcotics may cause nausea and vomiting. If persistent please call the office. Wound Care: You have a special dressing called Exofin. It is okay to shower and let the water run over the incisions but do not scrub the area or soak in a tub. If you have a small amount of drainage you may place a dry bandage over the wound and change it daily. If you experience a lot of drainage, develop redness around the wound, or a fever over 101 F occurs please call the office. May use ice over incision for comfort. Wear abdominal binder at all times for 3 weeks and then when out of bed for additional 4 weeks    Medications:    Resume home medications as indicated on the Medical Reconciliation form. Aspirin and Coumadin can be restarted immediately if you were taking them preoperatively. If taking Plavix do not restart it until post operative day 2. Pain medications:  Non steroidal antiinflammatories seem to work best for post surgical pain. Try these first as prescribed. A narcotic prescription will also be given for breakthrough pain. Over the counter stool softeners and laxatives may be used if needed. Do not hesitate to call with questions or concerns. NOTE:  You have retention of urine in your bladder, likely from prostate enlargement. Follow up with [facility] for this in 1 week.         Patient Education Abdominal Hernia Repair: What to Expect at Home  Your Recovery  After surgery to repair your hernia, you are likely to have pain for a few days. You may also feel like you have the flu, and you may have a low fever and feel tired and nauseated. This is common. You should feel better after a few days and will probably feel much better in 7 days. For several weeks you may feel twinges or pulling in the hernia repair when you move. You may have some bruising around the area of your hernia repair. This is normal.  This care sheet gives you a general idea about how long it will take for you to recover. But each person recovers at a different pace. Follow the steps below to get better as quickly as possible. How can you care for yourself at home? Activity    · Rest when you feel tired. Getting enough sleep will help you recover.     · Try to walk each day. Start by walking a little more than you did the day before. Bit by bit, increase the amount you walk. Walking boosts blood flow and helps prevent pneumonia and constipation.     · If your doctor gives you an abdominal binder to wear, use it as directed. This is an elastic bandage that wraps around your belly and upper hips. It helps support your belly muscles after surgery.     · Avoid strenuous activities, such as biking, jogging, weight lifting, or aerobic exercise, until your doctor says it is okay.     · Avoid lifting anything that would make you strain. This may include heavy grocery bags and milk containers, a heavy briefcase or backpack, cat litter or dog food bags, a vacuum , or a child.     · Ask your doctor when you can drive again.     · Most people are able to return to work within 1 to 2 weeks after surgery. But if your job requires that you to do heavy lifting or strenuous activity, you may need to take 4 to 6 weeks off from work.     · You may shower 24 to 48 hours after surgery, if your doctor okays it. Pat the cut (incision) dry.  Do not take a bath for the first 2 weeks, or until your doctor tells you it is okay.     · Ask your doctor when it is okay for you to have sex. Diet    · You can eat your normal diet. If your stomach is upset, try bland, low-fat foods like plain rice, broiled chicken, toast, and yogurt.     · Drink plenty of fluids (unless your doctor tells you not to).     · You may notice that your bowel movements are not regular right after your surgery. This is common. Avoid constipation and straining with bowel movements. You may want to take a fiber supplement every day. If you have not had a bowel movement after a couple of days, ask your doctor about taking a mild laxative. Medicines    · Your doctor will tell you if and when you can restart your medicines. He or she will also give you instructions about taking any new medicines.     · If you take blood thinners, such as warfarin (Coumadin), clopidogrel (Plavix), or aspirin, be sure to talk to your doctor. He or she will tell you if and when to start taking those medicines again. Make sure that you understand exactly what your doctor wants you to do.     · Be safe with medicines. Take pain medicines exactly as directed. ? If the doctor gave you a prescription medicine for pain, take it as prescribed. ? If you are not taking a prescription pain medicine, ask your doctor if you can take an over-the-counter medicine.     · If your doctor prescribed antibiotics, take them as directed. Do not stop taking them just because you feel better. You need to take the full course of antibiotics.     · If you think your pain medicine is making you sick to your stomach:  ? Take your medicine after meals (unless your doctor has told you not to). ? Ask your doctor for a different pain medicine. Incision care    · If you have strips of tape on the cut (incision) the doctor made, leave the tape on for a week or until it falls off. Or follow your doctor's instructions for removing the tape.   · If you have staples closing the cut, you will need to visit your doctor in 1 to 2 weeks to have them removed.     · Wash the area daily with warm, soapy water, and pat it dry. Don't use hydrogen peroxide or alcohol, which can slow healing. You may cover the area with a gauze bandage if it weeps or rubs against clothing. Change the bandage every day. Other instructions    · Hold a pillow over your incision when you cough or take deep breaths. This will support your belly and decrease your pain.     · Do breathing exercises at home as instructed by your doctor. This will help prevent pneumonia.     · If you had laparoscopic surgery, you may also have pain in your left shoulder. The pain usually lasts about a day or two. Follow-up care is a key part of your treatment and safety. Be sure to make and go to all appointments, and call your doctor if you are having problems. It's also a good idea to know your test results and keep a list of the medicines you take. When should you call for help? Call 911 anytime you think you may need emergency care. For example, call if:    · You passed out (lost consciousness).     · You are short of breath.    Call your doctor now or seek immediate medical care if:    · You are sick to your stomach and cannot drink fluids.     · You have signs of a blood clot in your leg (called a deep vein thrombosis), such as:  ? Pain in your calf, back of the knee, thigh, or groin. ? Redness and swelling in your leg or groin.     · You have signs of infection, such as:  ? Increased pain, swelling, warmth, or redness. ? Red streaks leading from the incision. ? Pus draining from the incision. ?  A fever.     · You cannot pass stools or gas.     · You have pain that does not get better after you take pain medicine.     · You have loose stitches, or your incision comes open.     · Bright red blood has soaked through the bandage over your incision.    Watch closely for changes in your health, and be sure to contact your doctor if you have any problems. Where can you learn more? Go to http://pilar-juan.info/. Enter B577 in the search box to learn more about \"Abdominal Hernia Repair: What to Expect at Home. \"  Current as of: March 28, 2018  Content Version: 11.8  © 6617-0885 [employer]. Care instructions adapted under license by [employer] (which disclaims liability or warranty for this information). If you have questions about a medical condition or this instruction, always ask your healthcare professional. [place] [address] any warranty or liability for your use of this information. Patient Education      Hydrocodone/Acetaminophen (Vicodin, Norco, Lortab) - (By mouth)   Why this medicine is used:   Treats pain. Contact a nurse or doctor right away if you have:  · Blistering, peeling, red skin rash  · Fast or slow heartbeat, shallow breathing, blue lips, fingernails, or skin  · Anxiety, restlessness, muscle spasms, twitching, seeing or hearing things that are not there  · Dark urine or pale stools, yellow skin or eyes  · Extreme weakness, sweating, seizures, cold or clammy skin  · Lightheadedness, dizziness, fainting, fever, sweating     Common side effects:  · Constipation, nausea, vomiting, loss of appetite, stomach pain  · Tiredness or sleepiness  © 2017 [facility] Information is for End User's use only and may not be sold, redistributed or otherwise used for commercial purposes.

## 2019-01-09 NOTE — PROGRESS NOTES
General Surgery End of Shift Nursing Note Bedside shift change report given to Stacey Guidry RN (oncoming nurse) by Debbie Agarwal RN (offgoing nurse). Report included the following information SBAR, Kardex, OR Summary, Intake/Output, MAR and Recent Results. Shift worked:   7A to [employer] Summary of shift:    S/P surgery today, abd lap sites x 4 with ecchymosis noted to all. Abd binder in place. Forman in place. Issues for physician to address:     
 
Number times ambulated in hallway past shift: 0 Number of times OOB to chair past shift: 0 Pain Management: 
Current medication: IV Dilaudid Patient states pain is manageable on current pain medication: YES 
 
GI: 
 
Current diet:  DIET CLEAR LIQUID 
DIET GI LITE (POST SURGICAL) Tolerating current diet: YES Passing flatus: NO 
Last Bowel Movement: yesterday Appearance:  
 
Respiratory: 
 
Incentive Spirometer at bedside: YES Patient instructed on use: YES Patient Safety: 
 
Falls Score: 2 Bed Alarm On? No 
Sitter?  No 
 
Jj Macedo RN

## 2019-01-09 NOTE — PROGRESS NOTES
Pt looking a little more relaxed and joking with nurse and family. Encouraged to turn off back, pt refuses until after next dose of IV pian medicine.
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
## 2019-01-09 NOTE — BRIEF OP NOTE
BRIEF OPERATIVE NOTE Date of Procedure: 1/9/2019 Preoperative Diagnosis: VENTRAL AND UMBILICAL HERNIA Postoperative Diagnosis: VENTRAL AND UMBILICAL HERNIA Bladder dilatation Procedure(s): 
ROBOTIC ASSISTED VENTRAL AND UMBILICAL HERNIA REPAIR WITH MESH Surgeon(s) and Role: Traci Sanchze MD - Primary Surgical Assistant: Shyann Arreaga Surgical Staff: 
Circ-1: Rolin Bernheim Scrub Tech-1: Valente Garcia Surg Asst-1: Devin Reza Event Time In Time Out Incision Start 4931 Incision Close Anesthesia: General  
Estimated Blood Loss: 20 ml Specimens: * No specimens in log * Findings: massive bladder (2400 ml after decompression), umbilical and ventral herniae Complications: none Implants:  
Implant Name Type Inv. Item Serial No.  Lot No. LRB No. Used Action MESH W/ECHO 2 - 20CM Maria Dougherty - [id number]  MESH W/ECHO 2 - 20CM 8IN -- Moo Tovar [id number] BARD [employer]LE [id number] N/A 1 Implanted

## 2019-01-09 NOTE — OP NOTES
[address]  OPERATIVE REPORT    Delbert Tate  MR#: [id number]  : 1945  ACCOUNT #: [de-identified]   DATE OF SERVICE: 2019    PREOPERATIVE DIAGNOSES:  Ventral, umbilical hernia and severe urinary retention. POSTOPERATIVE DIAGNOSES:  Ventral, umbilical hernia and severe urinary retention. PROCEDURE PERFORMED:  Robotic-assisted ventral hernia repair and umbilical hernia repair with mesh. SURGEON:  Osmar Singletary MD    ASSISTANT:  Diamante Reeves    ANESTHESIA:  General.    ESTIMATED BLOOD LOSS:  20 mL. COMPLICATIONS:  None. SPECIMENS REMOVED:  None. FINDINGS:  Very large bladder, which after placement of Forman catheter had 2400 mL of urine and also a ventral and umbilical hernia. BRIEF HISTORY:  The patient is a pleasant gentleman with a symptomatic hernia. Options were discussed and he elected to have repair. He understands the risks and benefits and wished to proceed. These are outlined in my office notes. He understands the risks, benefits and wished to proceed. He recently obtained surgery last week by Dr. Nora Cast and now comes in for his hernias. He understands the risks and benefits and wishes to proceed. He was taken to the operating room and placed on the operating table in supine position. Under general anesthesia, the abdomen was prepped and draped in the usual sterile fashion. It was noted that he had a ballottable mass in the mid lower abdomen. Initially, I was concerned about possibility of malignancy versus an enlarged bladder. I placed an 8 mm trocar in the left upper quadrant under direct vision, trocar inserted and insufflation up to 50 mmHg pressure gave good visualization. Placed 2 more trocars, one in the left lateral and one in the left lower abdomen. Looked around and it clearly appeared to be bladder. For that reason, we went ahead and placed a Forman catheter with about 2400 mL of clear urine.   Once that was completed, the robot was docked and utilizing electrocautery and sharp dissection, we reduced the hernias and excised the preperitoneal fat and removed all of that. We placed the VersaStep trocar in the left lower quadrant as we were going to need a large piece of mesh and then placed the trocar through that, and closed the midline fascia with a running #1 nonabsorbable V-Loc suture. Took a 6 x 8 inch piece of [employer] Ventralight mesh with chief component 6 x 8 inches. The lot number is [id number]. Then placed intraabdominally, pulled up to the inferior abdominal wall, pulling the Echo component to it and then running 0 absorbable V-Loc sutures were run around the perimeter of it, and then another one was run in the midline of it to pull it up to the anterior abdominal wall. Of note, we did hit an arterial vascular bleed on the right lateral abdomen right around the level of the umbilicus, most likely the left gastric. We did a figure-of-eight stitch to try to control that and it did not appear to have any further bleeding. Once that was completed,the robot was undocked and the trocars removed. CO2 was evacuated from the abdominal cavity. His abdominal wall was so small that even the 8 mm trocar you could see through the fascial defect. For that reason, I used interrupted 0 Vicryls to reapproximate the fascia at all the trocar sites in the left lateral abdomen and then running 4-0 Vicryl to close the skin and Dermabond dressings. Sterile dry dressings applied. At the completion of the operation, needle, sponge and instrument counts were correct x2. Patient tolerated the procedure well and was brought to recovery room. IMPLANTS:  Bard Davol mesh, lot [id number]. Implant name was with Echo2, 20 cm, 8 inches VentraLight ST [id number]. MD Daja Rodríguez / Laya Mensah  D: 01/09/2019 10:20     T: 01/09/2019 13:03  JOB #: [id number]  CC: Nasra Grant MD  CC: Mika Peng MD

## 2019-01-09 NOTE — PROGRESS NOTES
Problem: Surgical Pathway Day of Surgery Goal: Activity/Safety Outcome: Progressing Towards Goal 
Enc to roll off back once pain medicine makes him more comfortable. Goal: *Adequate urinary output (equal to or greater than 30 milliliters/hour) Ambulatory Surgery patients voiding without difficulty. Outcome: Progressing Towards Goal 
Forman in placed at the end of surgery due to history of urinary retention. Goal: *Tolerating diet Outcome: Progressing Towards Goal 
Tolerating ice chips and ice water so far,  Enc to go slow.

## 2019-01-09 NOTE — PROGRESS NOTES
Primary Nurse Tory Stein RN and JAMEL Bourgeois performed a dual skin assessment on this patient Impairment noted- see wound doc flow sheet Robert score is 20 Abdomen with 4 topical skin adhesive sites,  No skin breakdown noted to heels, elbows or sacrum. Right arm with dressing intact from prior surgery last week per patient, fingers warm and can move fingers well, sensation present in right fingers.

## 2019-01-09 NOTE — PERIOP NOTES
TRANSFER - OUT REPORT: 
 
Verbal report given to Raphael nikos) on Julio César Forman  being transferred to [facility] for routine post - op Report consisted of patients Situation, Background, Assessment and  
Recommendations(SBAR). Information from the following report(s) SBAR, OR Summary, Intake/Output, MAR and Accordion was reviewed with the receiving nurse. Opportunity for questions and clarification was provided. Patient transported with: 
 O2 @ 2 liters Tech

## 2019-01-10 PROCEDURE — 74011250636 HC RX REV CODE- 250/636: Performed by: SURGERY

## 2019-01-10 PROCEDURE — 99218 HC RM OBSERVATION: CPT

## 2019-01-10 PROCEDURE — 74011250637 HC RX REV CODE- 250/637: Performed by: SURGERY

## 2019-01-10 RX ORDER — ONDANSETRON 2 MG/ML
4 INJECTION INTRAMUSCULAR; INTRAVENOUS
Status: DISCONTINUED | OUTPATIENT
Start: 2019-01-10 | End: 2019-01-15 | Stop reason: HOSPADM

## 2019-01-10 RX ORDER — LISINOPRIL 20 MG/1
20 TABLET ORAL DAILY
Status: DISCONTINUED | OUTPATIENT
Start: 2019-01-10 | End: 2019-01-15 | Stop reason: HOSPADM

## 2019-01-10 RX ADMIN — ONDANSETRON 4 MG: 2 INJECTION INTRAMUSCULAR; INTRAVENOUS at 18:19

## 2019-01-10 RX ADMIN — ONDANSETRON 4 MG: 4 TABLET, ORALLY DISINTEGRATING ORAL at 06:16

## 2019-01-10 RX ADMIN — HYDROMORPHONE HYDROCHLORIDE 1 MG: 2 INJECTION, SOLUTION INTRAMUSCULAR; INTRAVENOUS; SUBCUTANEOUS at 18:20

## 2019-01-10 RX ADMIN — DEXTROSE MONOHYDRATE, SODIUM CHLORIDE, AND POTASSIUM CHLORIDE 75 ML/HR: 50; 4.5; 1.49 INJECTION, SOLUTION INTRAVENOUS at 02:12

## 2019-01-10 RX ADMIN — HYDROMORPHONE HYDROCHLORIDE 1 MG: 2 INJECTION, SOLUTION INTRAMUSCULAR; INTRAVENOUS; SUBCUTANEOUS at 18:21

## 2019-01-10 RX ADMIN — LISINOPRIL 20 MG: 20 TABLET ORAL at 12:34

## 2019-01-10 RX ADMIN — ONDANSETRON 4 MG: 2 INJECTION INTRAMUSCULAR; INTRAVENOUS at 23:57

## 2019-01-10 RX ADMIN — HYDROMORPHONE HYDROCHLORIDE 1 MG: 2 INJECTION, SOLUTION INTRAMUSCULAR; INTRAVENOUS; SUBCUTANEOUS at 06:38

## 2019-01-10 RX ADMIN — HYDROMORPHONE HYDROCHLORIDE 1 MG: 2 INJECTION, SOLUTION INTRAMUSCULAR; INTRAVENOUS; SUBCUTANEOUS at 23:57

## 2019-01-10 RX ADMIN — HYDROMORPHONE HYDROCHLORIDE 1 MG: 2 INJECTION, SOLUTION INTRAMUSCULAR; INTRAVENOUS; SUBCUTANEOUS at 14:30

## 2019-01-10 RX ADMIN — HYDROMORPHONE HYDROCHLORIDE 1 MG: 2 INJECTION, SOLUTION INTRAMUSCULAR; INTRAVENOUS; SUBCUTANEOUS at 02:10

## 2019-01-10 RX ADMIN — DEXTROSE MONOHYDRATE, SODIUM CHLORIDE, AND POTASSIUM CHLORIDE 75 ML/HR: 50; 4.5; 1.49 INJECTION, SOLUTION INTRAVENOUS at 14:08

## 2019-01-10 RX ADMIN — HYDROMORPHONE HYDROCHLORIDE 1 MG: 2 INJECTION, SOLUTION INTRAMUSCULAR; INTRAVENOUS; SUBCUTANEOUS at 10:32

## 2019-01-10 RX ADMIN — Medication 10 ML: at 13:18

## 2019-01-10 RX ADMIN — ENOXAPARIN SODIUM 40 MG: 40 INJECTION SUBCUTANEOUS at 23:57

## 2019-01-10 NOTE — PROGRESS NOTES
Admit Date: 2019 POD 1 Day Post-Op Procedure:  Procedure(s): 
ROBOTIC ASSISTED VENTRAL AND UMBILICAL HERNIA REPAIR WITH MESH Assessment:  
Active Problems: 
  Ventral hernia (2019) 1. Complains of pain 2. No family available at home 3. Urinary retention Plan/Recommendations/Medical Decision Makin. PT consult 2. SNF placement 3. Continue chiang Subjective:  
 
Patient has complaints of pain. Objective:  
 
Blood pressure 139/77, pulse 91, temperature 98.4 °F (36.9 °C), resp. rate 20, height 5' 9\" (1.753 m), weight 76.1 kg (167 lb 12.3 oz), SpO2 93 %. Temp (24hrs), Av.9 °F (36.6 °C), Min:96.7 °F (35.9 °C), Max:98.4 °F (36.9 °C) Physical Exam:  LUNG: clear to auscultation bilaterally, HEART: regular rate and rhythm, S1, S2 normal, no murmur, click, rub or gallop, ABDOMEN: soft, non-tender. Bowel sounds normal. No masses,  no organomegaly Labs:  
Recent Results (from the past 48 hour(s)) POC CHEM8 Collection Time: 19  7:05 AM  
Result Value Ref Range Calcium, ionized (POC) 1.21 1.12 - 1.32 mmol/L Sodium (POC) 137 136 - 145 mmol/L Potassium (POC) 4.0 3.5 - 5.1 mmol/L Chloride (POC) 101 98 - 107 mmol/L  
 CO2 (POC) 25 21 - 32 mmol/L Anion gap (POC) 16 10 - 20 mmol/L Glucose (POC) 89 65 - 100 mg/dL BUN (POC) 18 9 - 20 mg/dL Creatinine (POC) 1.2 0.6 - 1.3 mg/dL GFRAA, POC >60 >60 ml/min/1.73m2 GFRNA, POC 60 (L) >60 ml/min/1.73m2 Hematocrit (POC) 42 36.6 - 50.3 % Comment Comment Not Indicated. Data Review images and reports reviewed

## 2019-01-10 NOTE — PROGRESS NOTES
Physical Therapy Consult received and chart reviewed. Attempting to see patient for PT evaluation this pm. Patient requesting pain medication prior to mobilization. Nursing present and administering pain meds. Therapy session aborted. Will follow back later. Thank you, Elmer Feliciano, PT

## 2019-01-10 NOTE — PROGRESS NOTES
Physical Therapy Second attempt to see patient for PT evaluation made this pm. Patient agreeable but then experiencing N/V. Therapy session again aborted. Patient lives alone and hope to attend rehab at [facility] prior to returning to home. Will likely need only short course of rehab/fast track program. 
Will follow back tomorrow am to attempt evaluation again. Thank you, Kavon Oswald, PT

## 2019-01-10 NOTE — ROUTINE PROCESS
Bedside and Verbal shift change report given to Theresa Mayen RN (oncoming nurse) by Blayne Crabtree RN (offgoing nurse). Report included the following information SBAR, Kardex, Intake/Output and MAR.

## 2019-01-10 NOTE — PROGRESS NOTES
General Surgery End of Shift Nursing Note Bedside shift change report given to [address] (oncoming nurse) by Marlon Mahoney RN (offgoing nurse). Report included the following information SBAR, Kardex and Recent Results. Shift worked:   7p-7a Summary of shift:    Pt not tolerating pain well. Medicated x3 during the night. Slightly nauseas this morning, Zofran given. Pt very resistant to ambulating or even moving in bed. Risks of post op DVT's explained to pt. Pt verbalized understanding. Issues for physician to address:   Encouraged ambulation Number times ambulated in hallway past shift: 0 Number of times OOB to chair past shift: 0 Pain Management: 
Current medication: Dilaudid Patient states pain is manageable on current pain medication: NO 
 
GI: 
 
Current diet:  DIET GI LITE (POST SURGICAL) Tolerating current diet: NO 
Passing flatus: NO 
Last Bowel Movement: several days ago Appearance:  
 
Respiratory: 
 
Incentive Spirometer at bedside: YES Patient instructed on use: YES Patient Safety: 
 
Falls Score: 3 Bed Alarm On? No 
Sitter?  No 
 
Sung Russo RN

## 2019-01-11 ENCOUNTER — APPOINTMENT (OUTPATIENT)
Dept: GENERAL RADIOLOGY | Age: 74
DRG: 355 | End: 2019-01-11
Attending: SURGERY
Payer: MEDICARE

## 2019-01-11 PROCEDURE — 97165 OT EVAL LOW COMPLEX 30 MIN: CPT

## 2019-01-11 PROCEDURE — 74019 RADEX ABDOMEN 2 VIEWS: CPT

## 2019-01-11 PROCEDURE — 99218 HC RM OBSERVATION: CPT

## 2019-01-11 PROCEDURE — 74011250637 HC RX REV CODE- 250/637: Performed by: SURGERY

## 2019-01-11 PROCEDURE — 74011250636 HC RX REV CODE- 250/636: Performed by: SURGERY

## 2019-01-11 PROCEDURE — 97116 GAIT TRAINING THERAPY: CPT | Performed by: PHYSICAL THERAPIST

## 2019-01-11 PROCEDURE — 97535 SELF CARE MNGMENT TRAINING: CPT

## 2019-01-11 PROCEDURE — 97161 PT EVAL LOW COMPLEX 20 MIN: CPT | Performed by: PHYSICAL THERAPIST

## 2019-01-11 RX ORDER — AMOXICILLIN 250 MG
2 CAPSULE ORAL DAILY
Status: DISCONTINUED | OUTPATIENT
Start: 2019-01-11 | End: 2019-01-15 | Stop reason: HOSPADM

## 2019-01-11 RX ORDER — HYDROMORPHONE HYDROCHLORIDE 2 MG/1
2 TABLET ORAL
Status: DISCONTINUED | OUTPATIENT
Start: 2019-01-11 | End: 2019-01-15 | Stop reason: HOSPADM

## 2019-01-11 RX ORDER — FACIAL-BODY WIPES
10 EACH TOPICAL DAILY PRN
Status: DISCONTINUED | OUTPATIENT
Start: 2019-01-11 | End: 2019-01-15 | Stop reason: HOSPADM

## 2019-01-11 RX ADMIN — HYDROMORPHONE HYDROCHLORIDE 0.5 MG: 2 INJECTION, SOLUTION INTRAMUSCULAR; INTRAVENOUS; SUBCUTANEOUS at 08:37

## 2019-01-11 RX ADMIN — HYDROMORPHONE HYDROCHLORIDE 1 MG: 2 INJECTION, SOLUTION INTRAMUSCULAR; INTRAVENOUS; SUBCUTANEOUS at 16:57

## 2019-01-11 RX ADMIN — ENOXAPARIN SODIUM 40 MG: 40 INJECTION SUBCUTANEOUS at 20:41

## 2019-01-11 RX ADMIN — ONDANSETRON 4 MG: 2 INJECTION INTRAMUSCULAR; INTRAVENOUS at 20:47

## 2019-01-11 RX ADMIN — DEXTROSE MONOHYDRATE, SODIUM CHLORIDE, AND POTASSIUM CHLORIDE 75 ML/HR: 50; 4.5; 1.49 INJECTION, SOLUTION INTRAVENOUS at 18:41

## 2019-01-11 RX ADMIN — ONDANSETRON 4 MG: 2 INJECTION INTRAMUSCULAR; INTRAVENOUS at 04:28

## 2019-01-11 RX ADMIN — HYDROMORPHONE HYDROCHLORIDE 0.5 MG: 2 INJECTION, SOLUTION INTRAMUSCULAR; INTRAVENOUS; SUBCUTANEOUS at 12:56

## 2019-01-11 RX ADMIN — STANDARDIZED SENNA CONCENTRATE AND DOCUSATE SODIUM 2 TABLET: 8.6; 5 TABLET, FILM COATED ORAL at 18:37

## 2019-01-11 RX ADMIN — HYDROMORPHONE HYDROCHLORIDE 1 MG: 2 INJECTION, SOLUTION INTRAMUSCULAR; INTRAVENOUS; SUBCUTANEOUS at 20:41

## 2019-01-11 RX ADMIN — Medication 10 ML: at 22:00

## 2019-01-11 RX ADMIN — HYDROMORPHONE HYDROCHLORIDE 1 MG: 2 INJECTION, SOLUTION INTRAMUSCULAR; INTRAVENOUS; SUBCUTANEOUS at 04:28

## 2019-01-11 RX ADMIN — ONDANSETRON 4 MG: 2 INJECTION INTRAMUSCULAR; INTRAVENOUS at 14:40

## 2019-01-11 RX ADMIN — LISINOPRIL 20 MG: 20 TABLET ORAL at 09:34

## 2019-01-11 RX ADMIN — Medication 40 ML: at 14:00

## 2019-01-11 RX ADMIN — ONDANSETRON 4 MG: 2 INJECTION INTRAMUSCULAR; INTRAVENOUS at 08:37

## 2019-01-11 NOTE — PROGRESS NOTES
3:43PM 
Request for insurance authorization submitted electronically and supporting documents faxed to [facility]. CM will continue to follow and assist with d/c planning. Ronel Stark MSW Care Manager
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
## 2019-01-11 NOTE — PROGRESS NOTES
Admit Date: 2019 POD 1 Day Post-Op Procedure:  Procedure(s): 
ROBOTIC ASSISTED VENTRAL AND UMBILICAL HERNIA REPAIR WITH MESH Assessment:  
Active Problems: 
  Ventral hernia (2019) 1. Complains of pain 2. No family available at home 3. Urinary retention Plan/Recommendations/Medical Decision Makin. PT consult 2. SNF placement when available 3. Continue chiang until f/u with urology Subjective:  
 
Patient has complaints of pain. Objective:  
 
Blood pressure 149/86, pulse (!) 107, temperature 97.5 °F (36.4 °C), resp. rate 18, height 5' 9\" (1.753 m), weight 76.1 kg (167 lb 12.3 oz), SpO2 93 %. Temp (24hrs), Av.4 °F (36.9 °C), Min:97.5 °F (36.4 °C), Max:98.8 °F (37.1 °C) Physical Exam:  LUNG: clear to auscultation bilaterally, HEART: regular rate and rhythm, S1, S2 normal, no murmur, click, rub or gallop, ABDOMEN: soft, non-tender. Bowel sounds normal. No masses,  no organomegaly Labs:  
No results found for this or any previous visit (from the past 48 hour(s)). Data Review images and reports reviewed

## 2019-01-11 NOTE — PROGRESS NOTES
CM spoke with pt concerning change in d/c plan. Pt expressed that he is now interested in going to SNF at discharge as no one will be home to take care of him during the day. CM provided pt with SNF list. Pt signed FOC document. Document placed in chart. Pt selected [address] [place] [address] and [facility]. CM sent referrals to both facilities. UPDATE 3:59PM 
Pt accepted to [address] [place] [address]. Awaiting insurance authorization in order to d/c. [place] Resides 4:05PM 
Dr. Carltoa Sherman informed that we are waiting on insurance auth for pt to d/c. Informed that Alvajuanjo Pham may come between 1/12 and 1/14. CM will continue to follow pt for d/c planning. Bear Tirado, Care Manager [phone]

## 2019-01-11 NOTE — PROGRESS NOTES
Problem: Falls - Risk of 
Goal: *Absence of Falls Document Zakiya End Fall Risk and appropriate interventions in the flowsheet. Outcome: Progressing Towards Goal 
Fall Risk Interventions: 
Mobility Interventions: Patient to call before getting OOB Medication Interventions: Patient to call before getting OOB Elimination Interventions: Call light in reach, Patient to call for help with toileting needs

## 2019-01-11 NOTE — PROGRESS NOTES
General Surgery End of Shift Nursing Note Bedside shift change report given to Alethea Montiel (oncoming nurse) by Penny Toribio RN (offgoing nurse). Report included the following information SBAR, Kardex and Recent Results. Shift worked:   7p-7a Summary of shift:    Still slightly nauseas. Tolerating pain better. Issues for physician to address:   Encourage ambulation, discharge planning Number times ambulated in hallway past shift: 0 Number of times OOB to chair past shift: 0 Pain Management: 
Current medication: Dilaudid Patient states pain is manageable on current pain medication: YES 
 
GI: 
 
Current diet:  DIET GI LITE (POST SURGICAL) Tolerating current diet: NO 
Passing flatus: NO 
Last Bowel Movement: several days ago Appearance:  
 
Respiratory: 
 
Incentive Spirometer at bedside: YES Patient instructed on use: YES Patient Safety: 
 
Falls Score: 2 Bed Alarm On? No 
Sitter?  No 
 
Madeline Miller, RN

## 2019-01-11 NOTE — PROGRESS NOTES
Patient was offered Leellen Part for his continued pain , which he rates 6 or more, but he refuses to take PO pain meds. This nurse attempted to explain to the patient and his family member the purpose of using PO pain meds in addition to IV pain meds to help transition patient for pain management at discharge, when IV pain meds will not be an option. Patient continues to refuse stating, \"I am too nauseated to take pills\". \"I told you that\" . Will notify MD of patient's status

## 2019-01-12 PROCEDURE — 74011250636 HC RX REV CODE- 250/636: Performed by: SURGERY

## 2019-01-12 PROCEDURE — 99218 HC RM OBSERVATION: CPT

## 2019-01-12 PROCEDURE — 74011250637 HC RX REV CODE- 250/637: Performed by: SURGERY

## 2019-01-12 RX ORDER — HYDROMORPHONE HYDROCHLORIDE 2 MG/ML
0.25 INJECTION, SOLUTION INTRAMUSCULAR; INTRAVENOUS; SUBCUTANEOUS
Status: DISCONTINUED | OUTPATIENT
Start: 2019-01-12 | End: 2019-01-15 | Stop reason: HOSPADM

## 2019-01-12 RX ORDER — HYDRALAZINE HYDROCHLORIDE 20 MG/ML
10 INJECTION INTRAMUSCULAR; INTRAVENOUS
Status: DISCONTINUED | OUTPATIENT
Start: 2019-01-12 | End: 2019-01-12 | Stop reason: SDUPTHER

## 2019-01-12 RX ORDER — HYDRALAZINE HYDROCHLORIDE 20 MG/ML
10 INJECTION INTRAMUSCULAR; INTRAVENOUS
Status: DISCONTINUED | OUTPATIENT
Start: 2019-01-12 | End: 2019-01-15 | Stop reason: HOSPADM

## 2019-01-12 RX ADMIN — ONDANSETRON 4 MG: 4 TABLET, ORALLY DISINTEGRATING ORAL at 15:50

## 2019-01-12 RX ADMIN — HYDROMORPHONE HYDROCHLORIDE 2 MG: 2 TABLET ORAL at 16:27

## 2019-01-12 RX ADMIN — ONDANSETRON 4 MG: 2 INJECTION INTRAMUSCULAR; INTRAVENOUS at 09:02

## 2019-01-12 RX ADMIN — Medication 10 ML: at 04:59

## 2019-01-12 RX ADMIN — ONDANSETRON 4 MG: 2 INJECTION INTRAMUSCULAR; INTRAVENOUS at 13:07

## 2019-01-12 RX ADMIN — HYDROMORPHONE HYDROCHLORIDE 0.26 MG: 2 INJECTION, SOLUTION INTRAMUSCULAR; INTRAVENOUS; SUBCUTANEOUS at 13:08

## 2019-01-12 RX ADMIN — DEXTROSE MONOHYDRATE, SODIUM CHLORIDE, AND POTASSIUM CHLORIDE 75 ML/HR: 50; 4.5; 1.49 INJECTION, SOLUTION INTRAVENOUS at 08:48

## 2019-01-12 RX ADMIN — ENOXAPARIN SODIUM 40 MG: 40 INJECTION SUBCUTANEOUS at 22:43

## 2019-01-12 RX ADMIN — STANDARDIZED SENNA CONCENTRATE AND DOCUSATE SODIUM 2 TABLET: 8.6; 5 TABLET, FILM COATED ORAL at 08:46

## 2019-01-12 RX ADMIN — Medication 10 ML: at 00:54

## 2019-01-12 RX ADMIN — ONDANSETRON 4 MG: 2 INJECTION INTRAMUSCULAR; INTRAVENOUS at 00:51

## 2019-01-12 RX ADMIN — LISINOPRIL 20 MG: 20 TABLET ORAL at 22:43

## 2019-01-12 RX ADMIN — ONDANSETRON 4 MG: 2 INJECTION INTRAMUSCULAR; INTRAVENOUS at 04:58

## 2019-01-12 RX ADMIN — ONDANSETRON 4 MG: 2 INJECTION INTRAMUSCULAR; INTRAVENOUS at 22:43

## 2019-01-12 RX ADMIN — HYDROMORPHONE HYDROCHLORIDE 0.5 MG: 2 INJECTION, SOLUTION INTRAMUSCULAR; INTRAVENOUS; SUBCUTANEOUS at 09:02

## 2019-01-12 RX ADMIN — Medication 10 ML: at 22:44

## 2019-01-12 RX ADMIN — HYDROMORPHONE HYDROCHLORIDE 1 MG: 2 INJECTION, SOLUTION INTRAMUSCULAR; INTRAVENOUS; SUBCUTANEOUS at 04:58

## 2019-01-12 RX ADMIN — HYDRALAZINE HYDROCHLORIDE 10 MG: 20 INJECTION INTRAMUSCULAR; INTRAVENOUS at 09:02

## 2019-01-12 RX ADMIN — HYDROMORPHONE HYDROCHLORIDE 1 MG: 2 INJECTION, SOLUTION INTRAMUSCULAR; INTRAVENOUS; SUBCUTANEOUS at 00:53

## 2019-01-12 NOTE — PROGRESS NOTES
Admit Date: 2019 POD 2 Day Post-Op Procedure:  Procedure(s): 
ROBOTIC ASSISTED VENTRAL AND UMBILICAL HERNIA REPAIR WITH MESH Assessment:  
Active Problems: 
  Ventral hernia (2019) 1. Less pain 2. No family available at home 3. Urinary retention Plan/Recommendations/Medical Decision Makin. PT consult 2. SNF placement when available 3. Continue chiang until f/u with urology Subjective:  
 
Patient refusing po pain meds, keeps spitting into emesis bag and stating he is vomiting. Setting alarm to wake up and request iv pain meds on schedule. Objective:  
 
Blood pressure 138/75, pulse (!) 110, temperature 98 °F (36.7 °C), resp. rate 18, height 5' 9\" (1.753 m), weight 167 lb 12.3 oz (76.1 kg), SpO2 94 %. Temp (24hrs), Av.4 °F (36.9 °C), Min:98 °F (36.7 °C), Max:98.8 °F (37.1 °C) Physical Exam:  LUNG: clear to auscultation bilaterally, HEART: regular rate and rhythm, S1, S2 normal, no murmur, click, rub or gallop, ABDOMEN: soft, non-tender. Bowel sounds normal. No masses,  no organomegaly Labs:  
No results found for this or any previous visit (from the past 48 hour(s)). Data Review images and reports reviewed

## 2019-01-12 NOTE — PROGRESS NOTES
While ambulating patient to chair, patient's phone alarm went off at the exact time he had pain medication available. This nurse explained to the patient how this is not an effective practice as the pain medications are as needed and if he is asleep and  waking himself with an alarm he reversing the relaxing and pain relief he has received from the pain medications.  
 
Will notify MD

## 2019-01-13 PROCEDURE — 74011250637 HC RX REV CODE- 250/637: Performed by: SURGERY

## 2019-01-13 PROCEDURE — 99218 HC RM OBSERVATION: CPT

## 2019-01-13 PROCEDURE — 74011250636 HC RX REV CODE- 250/636: Performed by: SURGERY

## 2019-01-13 PROCEDURE — 65660000000 HC RM CCU STEPDOWN

## 2019-01-13 PROCEDURE — 94760 N-INVAS EAR/PLS OXIMETRY 1: CPT

## 2019-01-13 RX ADMIN — ONDANSETRON 4 MG: 2 INJECTION INTRAMUSCULAR; INTRAVENOUS at 11:56

## 2019-01-13 RX ADMIN — Medication 10 ML: at 06:43

## 2019-01-13 RX ADMIN — DEXTROSE MONOHYDRATE, SODIUM CHLORIDE, AND POTASSIUM CHLORIDE 75 ML/HR: 50; 4.5; 1.49 INJECTION, SOLUTION INTRAVENOUS at 11:56

## 2019-01-13 RX ADMIN — Medication 10 ML: at 22:31

## 2019-01-13 RX ADMIN — STANDARDIZED SENNA CONCENTRATE AND DOCUSATE SODIUM 2 TABLET: 8.6; 5 TABLET, FILM COATED ORAL at 09:19

## 2019-01-13 RX ADMIN — LISINOPRIL 20 MG: 20 TABLET ORAL at 21:30

## 2019-01-13 RX ADMIN — ENOXAPARIN SODIUM 40 MG: 40 INJECTION SUBCUTANEOUS at 21:30

## 2019-01-13 NOTE — PROGRESS NOTES
Admit Date: 2019 POD 3 Day Post-Op Procedure:  Procedure(s): 
ROBOTIC ASSISTED VENTRAL AND UMBILICAL HERNIA REPAIR WITH MESH Assessment:  
Active Problems: 
  Ventral hernia (2019) 1. Less pain, not taking much pain meds 2. No family available at home 3. Urinary retention 4. Now ciro po and having flatus Plan/Recommendations/Medical Decision Makin. PT consult 2. SNF placement when available 3. Continue chiang until f/u with urology Subjective:  
 
Patient refusing po pain meds, keeps spitting into emesis bag and stating he is vomiting. Setting alarm to wake up and request iv pain meds on schedule. Objective:  
 
Blood pressure 152/85, pulse 93, temperature 98.2 °F (36.8 °C), resp. rate 17, height 5' 9\" (1.753 m), weight 167 lb 12.3 oz (76.1 kg), SpO2 93 %. Temp (24hrs), Av.5 °F (36.9 °C), Min:98 °F (36.7 °C), Max:99 °F (37.2 °C) Physical Exam:  LUNG: clear to auscultation bilaterally, HEART: regular rate and rhythm, S1, S2 normal, no murmur, click, rub or gallop, ABDOMEN: soft, non-tender. Bowel sounds normal. No masses,  no organomegaly Labs:  
No results found for this or any previous visit (from the past 48 hour(s)). Data Review images and reports reviewed

## 2019-01-13 NOTE — PROGRESS NOTES
General Surgery End of Shift Nursing Note Bedside shift change report given to Betsy (oncoming nurse) by Sera Corey (offgoing nurse). Report included the following information SBAR, Kardex and MAR. Shift worked:   7p-7a Summary of shift:    Pt had several emesis episodes overnight. Complained of nausea. Issues for physician to address:   CODE STATUS Number times ambulated in hallway past shift: 0 Number of times OOB to chair past shift: 1 Pain Management: 
Current medication: See [facility] Patient states pain is manageable on current pain medication: YES 
 
GI: 
 
Current diet:  DIET GI LITE (POST SURGICAL) Tolerating current diet: NO 
Passing flatus: NO 
Last Bowel Movement: several days ago Appearance:  
 
Respiratory: 
 
Incentive Spirometer at bedside: YES Patient instructed on use: YES Patient Safety: 
 
Falls Score: 2 Bed Alarm On? No 
Sitter? No 
 
Martha Quesada

## 2019-01-13 NOTE — ROUTINE PROCESS
General Surgery End of Shift Nursing Note B Shift worked:   7a-7p Summary of shift:   Patient had average shift, no adverse events to report. Patient has had x2 \"vomitting\" episodes. Patient has spit in bag when visualized by this nurse. Patient did agree to take PO nausea and Pain medications with relief of symptoms noted after administration. Flatus noted, but no BM this shift. Issues for physician to address:   Code Status is needed please. Number times ambulated in hallway past shift: 2 Number of times OOB to chair past shift: 3 Pain Management: 
Current medication: Dilaudid Patient states pain is manageable on current pain medication: YES 
 
GI: 
 
Current diet:  DIET GI LITE (POST SURGICAL) Tolerating current diet: YES Passing flatus: YES Last Bowel Movement: several days ago Appearance:  
 
Respiratory: 
 
Incentive Spirometer at bedside: YES Patient instructed on use: YES Patient Safety: 
 
Falls Score: 2 Bed Alarm On? Refused Sitter? Not applicable Marina Montejo RN

## 2019-01-14 PROCEDURE — 65660000000 HC RM CCU STEPDOWN

## 2019-01-14 PROCEDURE — 97535 SELF CARE MNGMENT TRAINING: CPT | Performed by: OCCUPATIONAL THERAPIST

## 2019-01-14 PROCEDURE — 74011250637 HC RX REV CODE- 250/637: Performed by: SURGERY

## 2019-01-14 PROCEDURE — 74011250636 HC RX REV CODE- 250/636: Performed by: SURGERY

## 2019-01-14 PROCEDURE — 97116 GAIT TRAINING THERAPY: CPT

## 2019-01-14 RX ADMIN — STANDARDIZED SENNA CONCENTRATE AND DOCUSATE SODIUM 2 TABLET: 8.6; 5 TABLET, FILM COATED ORAL at 09:48

## 2019-01-14 RX ADMIN — LISINOPRIL 20 MG: 20 TABLET ORAL at 21:45

## 2019-01-14 RX ADMIN — DEXTROSE MONOHYDRATE, SODIUM CHLORIDE, AND POTASSIUM CHLORIDE 75 ML/HR: 50; 4.5; 1.49 INJECTION, SOLUTION INTRAVENOUS at 17:04

## 2019-01-14 RX ADMIN — Medication 10 ML: at 21:48

## 2019-01-14 RX ADMIN — ENOXAPARIN SODIUM 40 MG: 40 INJECTION SUBCUTANEOUS at 21:45

## 2019-01-14 RX ADMIN — ONDANSETRON 4 MG: 2 INJECTION INTRAMUSCULAR; INTRAVENOUS at 21:53

## 2019-01-14 RX ADMIN — Medication 20 ML: at 21:45

## 2019-01-14 RX ADMIN — Medication 10 ML: at 06:31

## 2019-01-14 NOTE — PROGRESS NOTES
General Surgery End of Shift Nursing Note Bedside shift change report given to Rolo Marroquin (oncoming nurse) by Clyde Lees (offgoing nurse). Report included the following information SBAR, Kardex and MAR. Shift worked:   7p-7a Summary of shift:    Pt had large bm overnight. Hypertensive. No complaints of pain or nausea. Issues for physician to address:   N/A Number times ambulated in hallway past shift: 0 Number of times OOB to chair past shift: 0 Pain Management: 
Current medication: See [facility] Patient states pain is manageable on current pain medication: YES 
 
GI: 
 
Current diet:  DIET GI LITE (POST SURGICAL) Tolerating current diet: YES Passing flatus: YES Last Bowel Movement: today Appearance: loose Respiratory: 
 
Incentive Spirometer at bedside: YES Patient instructed on use: YES Patient Safety: 
 
Falls Score: 3 Bed Alarm On? No 
Sitter? No 
 
Floydene Cone

## 2019-01-14 NOTE — PROGRESS NOTES
Problem: Mobility Impaired (Adult and Pediatric) Goal: *Acute Goals and Plan of Care (Insert Text) Physical Therapy Goals Initiated 1/11/2019 1. Patient will move from supine to sit and sit to supine , scoot up and down and roll side to side in bed with independence within 7 day(s). 2.  Patient will transfer from bed to chair and chair to bed with modified independence using the least restrictive device within 7 day(s). 3.  Patient will perform sit to stand with modified independence within 7 day(s). 4.  Patient will ambulate with modified independence for 200 feet with the least restrictive device within 7 day(s). 5.  Patient will ascend/descend 4 stairs with 2 handrail(s) with modified independence within 7 day(s). physical Therapy TREATMENT Patient: Dann Peabody (44 y.o. male) Date: 1/14/2019 Diagnosis: Ventral hernia [K43.9] <principal problem not specified> Procedure(s) (LRB): 
ROBOTIC ASSISTED VENTRAL AND UMBILICAL HERNIA REPAIR WITH MESH (N/A) 5 Days Post-Op Precautions:   
Chart, physical therapy assessment, plan of care and goals were reviewed. ASSESSMENT: 
Pt cleared by nurse to mobilize.m Pt received up in chair with all needs met. Pt agreeable to therapy. Pt preformed sit to stand transfer at [facility]. Pt performed ambulated 180ft with RW at [facility]. Pt requiring constant cueing to hold head upright and to consistently take longer steps. Pt able to correct for short periods. Once back in room pt want to wash face. Pt requiring assistance to get was cloth and to get wet due to RUE having bandage on hand. Pt able to stand at sink at [facility]. Pt performed sit to supine at min A with cueing for log roll to decrease pain with bed mobility. Pt able to perform. Pt left in bed supine with all needs met. Pt will benefit from SNF rehab to improve strength and endurance for safe mobility  before returning home alone. Progression toward goals: Subjective:       Patient ID: Geo Lang is a 53 y.o. male.    Chief Complaint: Annual Exam     Patient here for annual exam.  53-year-old male with diabetes, dyslipidemia hypertension and anxiety.  He has had to change around his meds some including having some side effects to Invokana and it was not covered on his insurance.  He has only been on the new medicine a few weeks but likes it.  We will update some labs.  He has had some back discomfort and abdominal discomfort 12 months and worries about his kidneys and liver.  He also has noticed some palpitations at times with fast or heart heart beats or both.  There was some family history of heart disease and he wanted to get an evaluation.  It is unclear whether this is always with exertion but certainly given his blood pressure, diabetes and dyslipidemia I think it is worth evaluating.      Review of Systems   Constitutional: Negative for chills, fatigue, fever and unexpected weight change.   HENT: Negative for ear pain and sore throat.    Eyes: Negative for pain and visual disturbance.   Respiratory: Negative for cough, shortness of breath and wheezing.    Cardiovascular: Positive for palpitations. Negative for chest pain and leg swelling.   Gastrointestinal: Positive for abdominal pain. Negative for constipation, diarrhea, nausea and vomiting.   Genitourinary: Negative for difficulty urinating, frequency and hematuria.   Musculoskeletal: Positive for back pain (  SomePains are in the mid back near the CVA region). Negative for arthralgias, myalgias, neck pain and neck stiffness.   Skin: Negative for rash and wound.   Neurological: Negative for dizziness, numbness and headaches.   Hematological: Negative for adenopathy.   Psychiatric/Behavioral: Negative for dysphoric mood. The patient is not nervous/anxious.        Objective:      Physical Exam   Constitutional: He is oriented to person, place, and time. He appears well-developed and well-nourished. No  [x]    Improving appropriately and progressing toward goals 
[]    Improving slowly and progressing toward goals 
[]    Not making progress toward goals and plan of care will be adjusted PLAN: 
Patient continues to benefit from skilled intervention to address the above impairments. Continue treatment per established plan of care. Discharge Recommendations:  Estevan Talbot Further Equipment Recommendations for Discharge:  TBD by rehab. SUBJECTIVE:  
Patient stated Its getting better everyday.  OBJECTIVE DATA SUMMARY:  
Critical Behavior: 
Neurologic State: Alert Functional Mobility Training: 
Bed Mobility: 
  
  
Sit to Supine: Minimum assistance; Additional time Transfers: 
Sit to Stand: Contact guard assistance Stand to Sit: Contact guard assistance Balance: 
Sitting: Intact Standing: Impaired; With support Standing - Static: Good;Constant support Standing - Dynamic : GoodAmbulation/Gait Training: 
Distance (ft): 180 Feet (ft) Assistive Device: Gait belt;Walker, rolling Ambulation - Level of Assistance: Contact guard assistance Gait Abnormalities: Decreased step clearance(head down posture ) Base of Support: Widened Speed/Radha: Pace decreased (<100 feet/min) Step Length: Right shortened;Left shortened Pain: 
Pain Scale 1: Numeric (0 - 10) Pain Intensity 1: 2 Pain Location 1: Abdomen Pain Orientation 1: Mid 
Pain Description 1: Aching Activity Tolerance:  
Pt moving well but with decreased endurance. After treatment:  
[]    Patient left in no apparent distress sitting up in chair 
[x]    Patient left in no apparent distress in bed 
[x]    Call bell left within reach [x]    Nursing notified 
[]    Caregiver present 
[]    Bed alarm activated COMMUNICATION/COLLABORATION:  
The patients plan of care was discussed with: Registered Nurse Penny Montes De Oca Time Calculation: 18 mins distress.   Overweight male no acute distress   HENT:   Head: Normocephalic and atraumatic.   Right Ear: External ear normal.   Left Ear: External ear normal.   Nose: Nose normal. No rhinorrhea.   Mouth/Throat: Oropharynx is clear and moist and mucous membranes are normal. No oropharyngeal exudate.   Eyes: Conjunctivae and EOM are normal. Pupils are equal, round, and reactive to light.   Neck: Normal range of motion. Neck supple. No JVD present. No thyromegaly present.   No supraclavicular nodes palpated bilaterally.    Cardiovascular: Normal rate, regular rhythm, normal heart sounds and intact distal pulses.    No murmur heard.  Pulmonary/Chest: Effort normal and breath sounds normal. He has no wheezes. He has no rales.   Abdominal: Soft. Bowel sounds are normal. He exhibits no distension and no mass. There is tenderness ( minimalFlank tenderness.  No masses noted).   Genitourinary: Rectum normal and prostate normal. Rectal exam shows no tenderness and guaiac negative stool. Prostate is not enlarged and not tender.   Genitourinary Comments: Hemoccult Card Test revealed + control.    Musculoskeletal: Normal range of motion. He exhibits no edema or tenderness.   Lymphadenopathy:     He has no cervical adenopathy.        Right: No supraclavicular adenopathy present.        Left: No supraclavicular adenopathy present.   Neurological: He is alert and oriented to person, place, and time. He has normal reflexes. No cranial nerve deficit.   Reflex Scores:       Bicep reflexes are 2+ on the right side and 2+ on the left side.       Patellar reflexes are 2+ on the right side and 2+ on the left side.  Skin: Skin is warm and dry. No rash noted.   Psychiatric: He has a normal mood and affect. His behavior is normal. He does not exhibit a depressed mood.       Assessment:       1. Routine physical examination    2. Tachycardia    3. Palpitations    4. CALVIN (obstructive sleep apnea)    5. Gastroesophageal reflux disease without  esophagitis    6. Type 2 diabetes mellitus without complication, unspecified long term insulin use status    7. Essential hypertension    8. Hyperlipidemia, unspecified hyperlipidemia type    9. Back pain of thoracolumbar region    10. Abdominal pain, unspecified abdominal location    11. Left flank pain        Plan:       Geo was seen today for annual exam.    Diagnoses and all orders for this visit:    Routine physical examination  -     Lipid panel; Future  -     Comprehensive metabolic panel; Future  -     Hemoglobin A1c; Future  -     PSA, Screening; Future    Tachycardia  -     Lipid panel; Future  -     Comprehensive metabolic panel; Future  -     Hemoglobin A1c; Future  -     PSA, Screening; Future  -     Holter monitor - 48 hour    Palpitations  -     Lipid panel; Future  -     Comprehensive metabolic panel; Future  -     Hemoglobin A1c; Future  -     PSA, Screening; Future  -     Holter monitor - 48 hour    CALVIN (obstructive sleep apnea)  -     Lipid panel; Future  -     Comprehensive metabolic panel; Future  -     Hemoglobin A1c; Future  -     PSA, Screening; Future    Gastroesophageal reflux disease without esophagitis  -     US Abdomen Complete; Future  -     Urinalysis; Future  -     Urine culture; Future  -     Lipid panel; Future  -     Comprehensive metabolic panel; Future  -     Hemoglobin A1c; Future  -     PSA, Screening; Future    Type 2 diabetes mellitus without complication, unspecified long term insulin use status  -     Lipid panel; Future  -     Comprehensive metabolic panel; Future  -     Hemoglobin A1c; Future  -     PSA, Screening; Future    Essential hypertension  -     Lipid panel; Future  -     Comprehensive metabolic panel; Future  -     Hemoglobin A1c; Future  -     PSA, Screening; Future    Hyperlipidemia, unspecified hyperlipidemia type  -     Lipid panel; Future  -     Comprehensive metabolic panel; Future  -     Hemoglobin A1c; Future  -     PSA, Screening; Future    Back pain  of thoracolumbar region  -     Lipid panel; Future  -     Comprehensive metabolic panel; Future  -     Hemoglobin A1c; Future  -     PSA, Screening; Future    Abdominal pain, unspecified abdominal location  -     US Abdomen Complete; Future  -     Urinalysis; Future  -     Urine culture; Future  -     Lipid panel; Future  -     Comprehensive metabolic panel; Future  -     Hemoglobin A1c; Future  -     PSA, Screening; Future    Left flank pain  -     US Abdomen Complete; Future  -     Urinalysis; Future  -     Urine culture; Future  -     Lipid panel; Future  -     Comprehensive metabolic panel; Future  -     Hemoglobin A1c; Future  -     PSA, Screening; Future         Review all studies.

## 2019-01-14 NOTE — PROGRESS NOTES
General Surgery End of Shift Nursing Note Bedside shift change report given to Ginger RN (oncoming nurse) by Denise Duffy RN (offgoing nurse). Report included the following information SBAR. Shift worked:   7am-7pm  
Summary of shift:  Patient in bed resting Mild pain in mid abdomen Issues for physician to address:     
 
Number times ambulated in hallway past shift: 2 Number of times OOB to chair past shift: 1 Pain Management: 
Current medication: Not given on this shift Patient states pain is manageable on current pain medication:  
 
GI: 
 
Current diet:  DIET GI LITE (POST SURGICAL) Tolerating current diet: YES Passing flatus: YES Last Bowel Movement: today Appearance: loose with some solid Respiratory: 
 
Incentive Spirometer at bedside: YES Patient instructed on use: YES Patient Safety: 
 
Falls Score: 3 Bed Alarm On? Not applicable Sitter? Not applicable Denise Duffy

## 2019-01-14 NOTE — PROGRESS NOTES
CM contacted Jud Moeller Calista Gilford) to see if [place] has been received. Auth remains in pending status. CM will continue to follow pt for d/c planning needs. UPDATE 1:04PM 
Abigail Moon Requesting most initial therapy notes and most recent physician note. CM spoke with nurse about having pt be seen by PT/OT today for more recent therapy notes. CM awaiting notes. CM faxed initial therapy and physician notes to [facility] for evaluation. UPDATE 2:34PM 
Abigail Moon states they did not receive faxed documentation. CM resent documentation and included PT/OT notes from 1/14/19. UPDATE 3:19PM 
CM provided Dr. Cassie Sweet with an update on pt d/c delay. CM will continue to follow pt for d/c planning. Deyvi Mortensen, Care Manager [phone]

## 2019-01-14 NOTE — PROGRESS NOTES
Problem: Self Care Deficits Care Plan (Adult) Goal: *Acute Goals and Plan of Care (Insert Text) Occupational Therapy Goals Initiated 1/11/2019 1. Patient will perform grooming with supervision/set-up within 7 day(s). 2.  Patient will perform bathing with moderate assistance  within 7 day(s). 3.  Patient will perform lower body dressing with moderate assistance  within 7 day(s). 4.  Patient will perform toilet transfers with supervision/set-up within 7 day(s). 5.  Patient will perform all aspects of toileting with supervision/set-up within 7 day(s). 6.  Patient will participate in upper extremity therapeutic exercise/activities with independence for 5 minutes within 7 day(s). 7.  Patient will utilize energy conservation techniques during functional activities with verbal cues within 7 day(s). Occupational Therapy TREATMENT Patient: Rae Enriquez (41 y.o. male) Date: 1/14/2019 Diagnosis: Ventral hernia [K43.9] <principal problem not specified> Procedure(s) (LRB): 
ROBOTIC ASSISTED VENTRAL AND UMBILICAL HERNIA REPAIR WITH MESH (N/A) 5 Days Post-Op Precautions: Fall(abd binder) Chart, occupational therapy assessment, plan of care, and goals were reviewed. ASSESSMENT: 
Pt is making slow, steady gains towards goals. He remains limited by abdominal pain, decreased strength, endurance, mobility, balance in standing and safety. He was able to perform LE dressing tasks with min A, toileting with min A and functional mobility with CGA using RW to amb. Continue to recommend rehab at discharge as pt lives alone and does not have assist available. Progression toward goals: 
[]       Improving appropriately and progressing toward goals [x]       Improving slowly and progressing toward goals 
[]       Not making progress toward goals and plan of care will be adjusted PLAN: 
Patient continues to benefit from skilled intervention to address the above impairments. Continue treatment per established plan of care. Discharge Recommendations:  Estevan Talbot Further Equipment Recommendations for Discharge:  TBD SUBJECTIVE:  
Patient stated I know I can't go home since I don't have any help.  OBJECTIVE DATA SUMMARY:  
Cognitive/Behavioral Status: 
Neurologic State: Alert; Appropriate for age Orientation Level: Oriented X4 Cognition: Appropriate for age attention/concentration; Follows commands Perception: Appears intact Perseveration: No perseveration noted Safety/Judgement: Awareness of environment; Fall prevention; Insight into deficits Functional Mobility and Transfers for ADLs:Bed Mobility: 
Supine to Sit: Stand-by assistance; Additional time Sit to Supine: Minimum assistance; Additional time Scooting: Stand-by assistance Transfers: 
Sit to Stand: Contact guard assistance Functional Transfers Bathroom Mobility: Contact guard assistance(using RW) Toilet Transfer : Contact guard assistance; Additional time(using RW and grab bar) Cues: Tactile cues provided;Verbal cues provided;Visual cues provided Adaptive Equipment: Grab bars; Elveria Courtney (comment) Balance: 
Sitting: Intact Standing: Impaired; With support Standing - Static: Good;Constant support Standing - Dynamic : Good ADL Intervention: 
Patient instructed and indicated understanding energy conservation techniques to increase independence and safety during all ADLs for end goal of returning back home. Provided instruction body is like a jar of marbles, marbles represent energy, at end of day need as many marbles as possible to obtain a good night sleep, REM sleep is when the body repairs itself. This ensures a full jar of marbles, full of energy when wake up to start a new day. Use energy conservation techniques during ADLs so can increase participation in life activities patient prefers, to ensure more frequent good days.  If having a bad day, evaluate tasks completed day before and re-plan how to save energy to complete same tasks, for example if going grocery shopping do not complete full bathing/dressing/grooming. Patient indicated understanding by stating tasks already completing to save energy ie sitting to don all clothing. Lower Body Dressing Assistance Socks: Minimum assistance(for LLE to reach toes) Leg Crossed Method Used: Yes Position Performed: Seated in chair Cues: Doff;Don;Physical assistance;Verbal cues provided;Visual cues provided Toileting Toileting Assistance: Minimum assistance Bladder Hygiene: (chiang) Bowel Hygiene: Minimum assistance(for cleanliness) Clothing Management: Contact guard assistance Cues: Tactile cues provided;Verbal cues provided;Visual cues provided Adaptive Equipment: Walker;Grab bars Cognitive Retraining Safety/Judgement: Awareness of environment; Fall prevention; Insight into deficits Pain: 
Pain Scale 1: Numeric (0 - 10) Pain Intensity 1: 2 Pain Location 1: Abdomen Pain Orientation 1: Mid 
Pain Description 1: Aching Activity Tolerance:  
Fair Please refer to the flowsheet for vital signs taken during this treatment. After treatment:  
[x] Patient left in no apparent distress sitting up in chair 
[] Patient left in no apparent distress in bed 
[] Call bell left within reach 
[] Nursing notified 
[] Caregiver present 
[] Bed alarm activated COMMUNICATION/COLLABORATION:  
The patients plan of care was discussed with: Registered Nurse Joesph Zuleta OT Time Calculation: 24 mins

## 2019-01-14 NOTE — PROGRESS NOTES
Admit Date: 2019 POD 5 Day Post-Op Procedure:  Procedure(s): 
ROBOTIC ASSISTED VENTRAL AND UMBILICAL HERNIA REPAIR WITH MESH Assessment:  
Active Problems: 
  Ventral hernia (2019) 1. Pain much improved 2. No family available at home 3. Urinary retention Plan/Recommendations/Medical Decision Makin. PT consult 2. Still awaiting SNF placement when available 3. Continue chiang until f/u with urology Subjective:  
 
Patient reports feeling better Objective:  
 
Blood pressure 151/88, pulse 85, temperature 98.5 °F (36.9 °C), resp. rate 18, height 5' 9\" (1.753 m), weight 76.1 kg (167 lb 12.3 oz), SpO2 95 %. Temp (24hrs), Av.4 °F (36.9 °C), Min:97.8 °F (36.6 °C), Max:98.7 °F (37.1 °C) Physical Exam:  LUNG: clear to auscultation bilaterally, HEART: regular rate and rhythm, S1, S2 normal, no murmur, click, rub or gallop, ABDOMEN: soft, non-tender. Bowel sounds normal. No masses,  no organomegaly Labs:  
No results found for this or any previous visit (from the past 48 hour(s)). Data Review images and reports reviewed

## 2019-01-15 VITALS
TEMPERATURE: 98 F | HEART RATE: 83 BPM | WEIGHT: 167.77 LBS | RESPIRATION RATE: 17 BRPM | DIASTOLIC BLOOD PRESSURE: 77 MMHG | OXYGEN SATURATION: 94 % | HEIGHT: 69 IN | SYSTOLIC BLOOD PRESSURE: 149 MMHG | BODY MASS INDEX: 24.85 KG/M2

## 2019-01-15 PROCEDURE — 74011250637 HC RX REV CODE- 250/637: Performed by: SURGERY

## 2019-01-15 PROCEDURE — 74011250636 HC RX REV CODE- 250/636: Performed by: SURGERY

## 2019-01-15 RX ORDER — ONDANSETRON 4 MG/1
4 TABLET, ORALLY DISINTEGRATING ORAL
Qty: 20 TAB | Refills: 0 | Status: SHIPPED | OUTPATIENT
Start: 2019-01-15 | End: 2019-01-25

## 2019-01-15 RX ADMIN — STANDARDIZED SENNA CONCENTRATE AND DOCUSATE SODIUM 2 TABLET: 8.6; 5 TABLET, FILM COATED ORAL at 10:03

## 2019-01-15 RX ADMIN — Medication 10 ML: at 06:56

## 2019-01-15 RX ADMIN — DEXTROSE MONOHYDRATE, SODIUM CHLORIDE, AND POTASSIUM CHLORIDE 75 ML/HR: 50; 4.5; 1.49 INJECTION, SOLUTION INTRAVENOUS at 08:37

## 2019-01-15 RX ADMIN — ONDANSETRON 4 MG: 2 INJECTION INTRAMUSCULAR; INTRAVENOUS at 07:56

## 2019-01-15 NOTE — PROGRESS NOTES
Admit Date: 2019 POD 6 Day Post-Op Procedure:  Procedure(s): 
ROBOTIC ASSISTED VENTRAL AND UMBILICAL HERNIA REPAIR WITH MESH Assessment:  
Active Problems: 
  Ventral hernia (2019) 1. Pain much improved 2. No family available at home 3. Urinary retention 4. Some nausea overnight 5. Weakness/decondition Plan/Recommendations/Medical Decision Makin. PT/OT 2. Still awaiting SNF placement when approved by insurance 3. Continue chiang until f/u with urology 4. Prn antiemetic Subjective:  
 
Patient reports some nausea overnight Objective:  
 
Blood pressure 150/82, pulse 83, temperature 97.9 °F (36.6 °C), resp. rate 18, height 5' 9\" (1.753 m), weight 76.1 kg (167 lb 12.3 oz), SpO2 95 %. Temp (24hrs), Av.7 °F (37.1 °C), Min:97.9 °F (36.6 °C), Max:99.3 °F (37.4 °C) Physical Exam:  LUNG: clear to auscultation bilaterally, HEART: regular rate and rhythm, S1, S2 normal, no murmur, click, rub or gallop, ABDOMEN: soft, non-distended approp tender. Bowel sounds normal. No masses,  no organomegaly Labs:  
No results found for this or any previous visit (from the past 48 hour(s)). Data Review images and reports reviewed

## 2019-01-15 NOTE — PROGRESS NOTES
CM contacted [employer] for status of [employer] Auth. Auth is under review with Clinician. Expecting to receive auth at some point today. CM will continue to follow pt for d/c planning. UPDATE 10:24AM  
CM received call from [facility] requesting a peer to peer. [facility] explained new peer to peer process and provided call in number. For Peer to Peer:  
Dr. Landon Head [phone]  contacted Dr. Carmelita Diaz to alert him of request for peer to peer. CM left vm. UPDATE 10:32AM  
CM provided peer to peer information to Dr. Carmelita Diaz. CM will continue to follow pt for d/c planning needs. Dung Scott, Care Manager [phone]

## 2019-01-15 NOTE — DISCHARGE SUMMARY
Physician Discharge Summary     Patient ID:  Ron Lopez  [id number]  68 y.o.  1945    Allergies: Oxycodone    Admit Date: 1/9/2019    Discharge Date: 1/15/2019    * Admission Diagnoses: Ventral hernia [K43.9]    * Discharge Diagnoses:    Hospital Problems as of 1/15/2019 Date Reviewed: 1/3/2019          Codes Class Noted - Resolved POA    Ventral hernia ICD-10-CM: K43.9  ICD-9-CM: 553.20  1/9/2019 - Present Unknown               Admission Condition: Fair    * Discharge Condition: fair    * Procedures: Procedure(s):  ROBOTIC ASSISTED VENTRAL AND UMBILICAL HERNIA REPAIR WITH MESH    * Hospital Course:   Hospital course was complicated by urinary retention, nausea, pain issues, weakness/limited mobility, which added 6 days to the patient's length of stay while awaiting SNF placement    Consults: OT/PT    Significant Diagnostic Studies: none    * Disposition: [place] (SNF)    Discharge Medications:   Current Discharge Medication List      START taking these medications    Details   ondansetron (ZOFRAN ODT) 4 mg disintegrating tablet Take 1 Tab by mouth every eight (8) hours as needed for Nausea for up to 10 days. Qty: 20 Tab, Refills: 0      tamsulosin (FLOMAX) 0.4 mg capsule Take 1 Cap by mouth daily for 60 days. Qty: 30 Cap, Refills: 1         CONTINUE these medications which have NOT CHANGED    Details   multivitamin (ONE A DAY) tablet Take 1 Tab by mouth daily. lutein 20 mg tab Take  by mouth. glucosamine/msm/chondrt/C/hyal (GLUCOSAMINE-CHONDROITIN-MSM PO) Take 2 Tabs by mouth two (2) times a day. POTASSIUM GLUCONATE, BULK, 595 mg by Does Not Apply route daily. magnesium oxide (MAG-OX) 400 mg tablet Take 400 mg by mouth daily. lisinopril (PRINIVIL, ZESTRIL) 20 mg tablet TK 1 T PO QD  Refills: 1      loratadine 10 mg cap Take 10 mg by mouth every morning. guaifenesin/dextromethorphan (CORICIDIN HBP PO) Take  by mouth as needed.       guaifenesin (MUCINEX PO) Take  by mouth.      phenylephrine/DM/acetaminop/GG (MUCINEX SINUS-MAX SEV NADER,DM, PO) Take  by mouth as needed. naproxen sodium (NAPROSYN) 220 mg tablet Take 220 mg by mouth two (2) times daily (with meals). Indications: PAIN         STOP taking these medications       HYDROcodone-acetaminophen (NORCO) 5-325 mg per tablet Comments:   Reason for Stopping:         HYDROcodone-acetaminophen (NORCO) 5-325 mg per tablet Comments:   Reason for Stopping:               * Follow-up Care/Patient Instructions:   Activity: No driving for 2 weeks, No heavy lifting for 6 weeks and PT/OT Eval and Treat  Diet: Regular Diet  Wound Care: As directed    Follow-up Information     Follow up With Specialties Details Why Contact Info    Damon Patel MD Urology In 1 week  [address] [facility]  [phone]      Buck Weiss MD Internal Medicine   [address] [place] [address]  [phone]          Follow-up tests/labs tbd    Signed:  Ronel Velarde MD  1/15/2019  2:35 PM .

## 2019-01-15 NOTE — PROGRESS NOTES
Bedside and Verbal shift change report given to [facility], RN (oncoming nurse) by Arnold Damon RN (offgoing nurse). Report included the following information SBAR, Kardex, Intake/Output and MAR.

## 2019-01-15 NOTE — PROGRESS NOTES
CM received call from Ancelmo Arreaga at [facility]. Pt insurance Azeem Cabrera is approved. [facility] info Ref ID [id number] Auth # [id number] Pt approved for 3 day initial stay with 550 therapy hours at A level. CM contacted  [address] [place] [address],[address] to verify that bed is available for pt today. CM spoke with Chadwick Samson and provided her with [facility] information. Pt going to room 414 in the [facility] unit. Call [phone] for [facility] report. UPDATE 1:35PM 
Pt informed that [facility] was received. Pt will go to an outpatient appt at [facility] before reporting to [address] [place] [address],[address]. Chadwick Samson at [facility],[address] informed that pt will arrive to [facility] care no later than 4pm.  
 
CM has completed the d/c planning needs of the pt. Care Management Interventions PCP Verified by CM: Yes Mode of Transport at Discharge: Other (see comment)(Pt family will transport ) Transition of Care Consult (CM Consult): Discharge Planning, SNF Discharge Durable Medical Equipment: No 
Physical Therapy Consult: Yes Occupational Therapy Consult: Yes Current Support Network: Own Home, Family Lives [place] Confirm Follow Up Transport: Family Plan discussed with Pt/Family/Caregiver: Yes Freedom of Choice Offered: Yes Discharge Location Discharge Placement: Skilled nursing facility [facility] Canavan, Care Manager [phone]

## 2019-01-15 NOTE — PROGRESS NOTES
Problem: Falls - Risk of 
Goal: *Absence of Falls Document Sushila Ambriz Fall Risk and appropriate interventions in the flowsheet. Outcome: Progressing Towards Goal 
Fall Risk Interventions: 
Mobility Interventions: Bed/chair exit alarm Medication Interventions: Bed/chair exit alarm Elimination Interventions: Bed/chair exit alarm

## 2019-01-15 NOTE — PROGRESS NOTES
Pt with d/c order and states ready for d/c. PIV removed, site benign. Pt given d/c instructions both written and verbally and voices understanding. Pt given opportunity to have any questions answered. Dr Sofía Rowe e-prescribed Kindra Vasquez to pts pharmacy. Pts sister provided d/c transportation, pt to return to his prior living arrangement.

## 2019-02-01 ENCOUNTER — OFFICE VISIT (OUTPATIENT)
Dept: SURGERY | Age: 74
End: 2019-02-01

## 2019-02-01 VITALS
BODY MASS INDEX: 22.64 KG/M2 | OXYGEN SATURATION: 96 % | DIASTOLIC BLOOD PRESSURE: 73 MMHG | HEIGHT: 69 IN | WEIGHT: 152.9 LBS | HEART RATE: 96 BPM | SYSTOLIC BLOOD PRESSURE: 124 MMHG | TEMPERATURE: 98.4 F

## 2019-02-01 DIAGNOSIS — Z09 POSTOPERATIVE EXAMINATION: Primary | ICD-10-CM

## 2019-02-01 NOTE — PROGRESS NOTES
Surgery Follow up Procedure: RA ventral hernia repair with mesh OR date:  1/9/2019 Path:  sac S I feel ok  Had post op urinary retention which he states in chronic Visit Vitals /73 (BP 1 Location: Left arm, BP Patient Position: Sitting) Pulse 96 Temp 98.4 °F (36.9 °C) Ht 5' 9\" (1.753 m) Wt 69.4 kg (152 lb 14.4 oz) SpO2 96% BMI 22.58 kg/m² O Incisions healing well without infection No signs of hernia A/P Doing ok Refuses urology f/u No lifting for another 3 weeks RTC 6 weeks Bria Naik MD FACS

## 2019-02-01 NOTE — PROGRESS NOTES
Chief Complaint Patient presents with  Surgical Follow-up Robotic-assisted ventral hernia repair and umbilical hernia repair with mesh. 1/9/19 1. Have you been to the ER, urgent care clinic since your last visit? Hospitalized since your last visit? No 
 
2. Have you seen or consulted any other health care providers outside of the [address] [place] [address] since your last visit? Include any pap smears or colon screening.  No

## 2019-03-22 ENCOUNTER — OFFICE VISIT (OUTPATIENT)
Dept: SURGERY | Age: 74
End: 2019-03-22

## 2019-03-22 VITALS
HEIGHT: 69 IN | HEART RATE: 82 BPM | DIASTOLIC BLOOD PRESSURE: 70 MMHG | OXYGEN SATURATION: 93 % | BODY MASS INDEX: 24.14 KG/M2 | TEMPERATURE: 97.5 F | RESPIRATION RATE: 16 BRPM | WEIGHT: 163 LBS | SYSTOLIC BLOOD PRESSURE: 120 MMHG

## 2019-03-22 DIAGNOSIS — Z09 POSTOPERATIVE EXAMINATION: Primary | ICD-10-CM

## 2019-03-22 NOTE — PROGRESS NOTES
Surgery Follow up Procedure: RA ventral hernia repair with mesh OR date:  1/9/2019 Path:  sac S I feel ok  Had post op urinary retention which he states is 
 chronic Visit Vitals /70 (BP 1 Location: Left arm, BP Patient Position: Sitting) Pulse 82 Temp 97.5 °F (36.4 °C) (Oral) Resp 16 Ht 5' 9\" (1.753 m) Wt 73.9 kg (163 lb) SpO2 93% BMI 24.07 kg/m² O Incisions healing well without infection No signs of hernia A/P Doing ok  Refuses urology f/u 
  
 RTC prn 
 
 
Kannan Garcia MD FACS

## 2019-03-22 NOTE — PROGRESS NOTES
Chief Complaint Patient presents with  Surgical Follow-up 1. Have you been to the ER, urgent care clinic since your last visit? Hospitalized since your last visit? No 
 
2. Have you seen or consulted any other health care providers outside of the [address] [place] [address] since your last visit? Include any pap smears or colon screening.  No

## 2020-09-28 ENCOUNTER — TRANSCRIBE ORDER (OUTPATIENT)
Dept: SCHEDULING | Age: 75
End: 2020-09-28

## 2020-09-28 DIAGNOSIS — Z12.2 ENCOUNTER FOR SCREENING FOR LUNG CANCER: Primary | ICD-10-CM

## 2022-03-18 PROBLEM — M62.08 RECTUS DIASTASIS: Status: ACTIVE | Noted: 2018-10-10

## 2022-03-18 PROBLEM — K43.9 VENTRAL HERNIA WITHOUT OBSTRUCTION OR GANGRENE: Status: ACTIVE | Noted: 2018-10-10

## 2022-03-19 PROBLEM — K43.9 VENTRAL HERNIA: Status: ACTIVE | Noted: 2019-01-09

## 2022-03-20 PROBLEM — I10 ESSENTIAL HYPERTENSION: Status: ACTIVE | Noted: 2018-10-10

## 2022-03-20 PROBLEM — K42.9 UMBILICAL HERNIA WITHOUT OBSTRUCTION AND WITHOUT GANGRENE: Status: ACTIVE | Noted: 2018-10-10

## 2022-04-27 ENCOUNTER — TRANSCRIBE ORDER (OUTPATIENT)
Dept: SCHEDULING | Age: 77
End: 2022-04-27

## 2022-04-27 DIAGNOSIS — R09.89 BRUIT: Primary | ICD-10-CM

## 2022-04-29 ENCOUNTER — HOSPITAL ENCOUNTER (OUTPATIENT)
Dept: VASCULAR SURGERY | Age: 77
Discharge: HOME | End: 2022-04-29
Attending: NURSE PRACTITIONER
Payer: MEDICARE

## 2022-04-29 DIAGNOSIS — R09.89 BRUIT: ICD-10-CM

## 2022-04-29 LAB
LEFT CCA DIST DIAS: 16.7 CM/S
LEFT CCA DIST SYS: 63.3 CM/S
LEFT CCA PROX DIAS: 17.6 CM/S
LEFT CCA PROX SYS: 96.5 CM/S
LEFT ECA DIAS: 11.52 CM/S
LEFT ECA SYS: 69.8 CM/S
LEFT ICA DIST DIAS: 21.9 CM/S
LEFT ICA DIST SYS: 63.3 CM/S
LEFT ICA MID DIAS: 21.9 CM/S
LEFT ICA MID SYS: 96.9 CM/S
LEFT ICA PROX DIAS: 16.7 CM/S
LEFT ICA PROX SYS: 86.6 CM/S
LEFT ICA/CCA SYS: 1.53 NO UNITS
LEFT VERTEBRAL DIAS: 9.78 CM/S
LEFT VERTEBRAL SYS: 31.8 CM/S
RIGHT CCA DIST DIAS: 20.6 CM/S
RIGHT CCA DIST SYS: 69.8 CM/S
RIGHT CCA PROX DIAS: 16.7 CM/S
RIGHT CCA PROX SYS: 71 CM/S
RIGHT ECA DIAS: 14.18 CM/S
RIGHT ECA SYS: 81.2 CM/S
RIGHT ICA DIST DIAS: 24.1 CM/S
RIGHT ICA DIST SYS: 74.6 CM/S
RIGHT ICA MID DIAS: 28.5 CM/S
RIGHT ICA MID SYS: 90 CM/S
RIGHT ICA PROX DIAS: 13.1 CM/S
RIGHT ICA PROX SYS: 49.3 CM/S
RIGHT ICA/CCA SYS: 1.3 NO UNITS
RIGHT VERTEBRAL DIAS: 12.03 CM/S
RIGHT VERTEBRAL SYS: 36.2 CM/S
VAS LEFT SUBCLAVIAN PROX EDV: 0 CM/S
VAS LEFT SUBCLAVIAN PROX PSV: 90.5 CM/S
VAS RIGHT SUBCLAVIAN PROX EDV: 0 CM/S
VAS RIGHT SUBCLAVIAN PROX PSV: 83.8 CM/S

## 2022-04-29 PROCEDURE — 93880 EXTRACRANIAL BILAT STUDY: CPT

## 2022-12-09 ENCOUNTER — TRANSCRIBE ORDER (OUTPATIENT)
Dept: REGISTRATION | Age: 77
End: 2022-12-09

## 2022-12-09 ENCOUNTER — HOSPITAL ENCOUNTER (OUTPATIENT)
Dept: GENERAL RADIOLOGY | Age: 77
End: 2022-12-09
Payer: MEDICARE

## 2022-12-09 DIAGNOSIS — M54.50 LOWER BACK PAIN: ICD-10-CM

## 2022-12-09 DIAGNOSIS — M54.50 LOWER BACK PAIN: Primary | ICD-10-CM

## 2022-12-09 DIAGNOSIS — M25.559 HIP PAIN: ICD-10-CM

## 2022-12-09 DIAGNOSIS — M25.559 HIP PAIN: Primary | ICD-10-CM

## 2022-12-09 PROCEDURE — 73521 X-RAY EXAM HIPS BI 2 VIEWS: CPT

## 2022-12-09 PROCEDURE — 72100 X-RAY EXAM L-S SPINE 2/3 VWS: CPT

## 2024-02-11 ENCOUNTER — HOSPITAL ENCOUNTER (OUTPATIENT)
Facility: HOSPITAL | Age: 79
Discharge: HOME | End: 2024-02-14
Payer: MEDICARE

## 2024-02-11 DIAGNOSIS — Z87.891 PERSONAL HISTORY OF NICOTINE DEPENDENCE: ICD-10-CM

## 2024-02-11 PROCEDURE — 71271 CT THORAX LUNG CANCER SCR C-: CPT

## 2024-05-11 ENCOUNTER — HOSPITAL ENCOUNTER (OUTPATIENT)
Facility: HOSPITAL | Age: 79
End: 2024-05-11
Attending: INTERNAL MEDICINE
Payer: MEDICARE

## 2024-05-11 DIAGNOSIS — R93.89 ABNORMAL COMPUTERIZED AXIAL TOMOGRAPHY OF CHEST: ICD-10-CM

## 2024-05-11 PROCEDURE — 71250 CT THORAX DX C-: CPT

## 2024-05-24 ENCOUNTER — TRANSCRIBE ORDERS (OUTPATIENT)
Facility: HOSPITAL | Age: 79
End: 2024-05-24

## 2024-05-24 DIAGNOSIS — R93.89 ABNORMAL COMPUTERIZED AXIAL TOMOGRAPHY OF CHEST: Primary | ICD-10-CM

## 2024-06-10 ENCOUNTER — TRANSCRIBE ORDERS (OUTPATIENT)
Facility: HOSPITAL | Age: 79
End: 2024-06-10

## 2024-06-10 DIAGNOSIS — R93.89 ABNORMAL COMPUTERIZED AXIAL TOMOGRAPHY OF CHEST: Primary | ICD-10-CM

## 2024-06-10 DIAGNOSIS — R93.89 ABNORMAL FINDINGS ON DIAGNOSTIC IMAGING OF OTHER SPECIFIED BODY STRUCTURES: Primary | ICD-10-CM

## 2024-06-25 ENCOUNTER — HOSPITAL ENCOUNTER (OUTPATIENT)
Facility: HOSPITAL | Age: 79
Discharge: HOME | End: 2024-06-28
Attending: INTERNAL MEDICINE
Payer: MEDICARE

## 2024-06-25 VITALS — WEIGHT: 165 LBS | BODY MASS INDEX: 23.62 KG/M2 | HEIGHT: 70 IN

## 2024-06-25 DIAGNOSIS — R91.1 PULMONARY NODULE: ICD-10-CM

## 2024-06-25 LAB
GLUCOSE BLD STRIP.AUTO-MCNC: 91 MG/DL (ref 65–117)
SERVICE CMNT-IMP: NORMAL

## 2024-06-25 PROCEDURE — A9609 HC RX DIAGNOSTIC RADIOPHARMACEUTICAL: HCPCS | Performed by: INTERNAL MEDICINE

## 2024-06-25 PROCEDURE — 78815 PET IMAGE W/CT SKULL-THIGH: CPT

## 2024-06-25 PROCEDURE — 82962 GLUCOSE BLOOD TEST: CPT

## 2024-06-25 PROCEDURE — 3430000000 HC RX DIAGNOSTIC RADIOPHARMACEUTICAL: Performed by: INTERNAL MEDICINE

## 2024-06-25 RX ORDER — FLUDEOXYGLUCOSE F-18 500 MCI/ML
10 INJECTION INTRAVENOUS
Status: COMPLETED | OUTPATIENT
Start: 2024-06-25 | End: 2024-06-25

## 2024-06-25 RX ADMIN — FLUDEOXYGLUCOSE F-18 10 MILLICURIE: 500 INJECTION INTRAVENOUS at 10:35

## 2024-08-03 ENCOUNTER — HOSPITAL ENCOUNTER (EMERGENCY)
Facility: HOSPITAL | Age: 79
Discharge: HOME | End: 2024-08-03
Payer: MEDICARE

## 2024-08-03 ENCOUNTER — APPOINTMENT (OUTPATIENT)
Facility: HOSPITAL | Age: 79
End: 2024-08-03
Payer: MEDICARE

## 2024-08-03 VITALS
BODY MASS INDEX: 22.46 KG/M2 | HEART RATE: 81 BPM | OXYGEN SATURATION: 97 % | SYSTOLIC BLOOD PRESSURE: 165 MMHG | TEMPERATURE: 97.7 F | DIASTOLIC BLOOD PRESSURE: 95 MMHG | WEIGHT: 156.53 LBS | RESPIRATION RATE: 16 BRPM

## 2024-08-03 DIAGNOSIS — L02.01 FACIAL ABSCESS: Primary | ICD-10-CM

## 2024-08-03 PROCEDURE — 99284 EMERGENCY DEPT VISIT MOD MDM: CPT

## 2024-08-03 PROCEDURE — 76536 US EXAM OF HEAD AND NECK: CPT

## 2024-08-03 PROCEDURE — 10061 I&D ABSCESS COMP/MULTIPLE: CPT

## 2024-08-03 PROCEDURE — 2500000003 HC RX 250 WO HCPCS

## 2024-08-03 RX ORDER — LIDOCAINE HYDROCHLORIDE 10 MG/ML
10 INJECTION, SOLUTION EPIDURAL; INFILTRATION; INTRACAUDAL; PERINEURAL ONCE
Status: COMPLETED | OUTPATIENT
Start: 2024-08-03 | End: 2024-08-03

## 2024-08-03 RX ORDER — CEPHALEXIN 500 MG/1
500 CAPSULE ORAL 4 TIMES DAILY
Qty: 28 CAPSULE | Refills: 0 | Status: SHIPPED | OUTPATIENT
Start: 2024-08-03 | End: 2024-08-10

## 2024-08-03 RX ADMIN — LIDOCAINE HYDROCHLORIDE 10 ML: 10 INJECTION, SOLUTION EPIDURAL; INFILTRATION; INTRACAUDAL; PERINEURAL at 09:49

## 2024-08-03 ASSESSMENT — PAIN - FUNCTIONAL ASSESSMENT: PAIN_FUNCTIONAL_ASSESSMENT: 0-10

## 2024-08-03 ASSESSMENT — PAIN DESCRIPTION - LOCATION: LOCATION: FACE

## 2024-08-03 ASSESSMENT — PAIN SCALES - GENERAL: PAINLEVEL_OUTOF10: 1

## 2024-08-03 ASSESSMENT — PAIN DESCRIPTION - ORIENTATION: ORIENTATION: RIGHT

## 2024-08-03 ASSESSMENT — PAIN DESCRIPTION - DESCRIPTORS: DESCRIPTORS: ACHING
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
## 2024-08-03 NOTE — ED NOTES
Pt stated to nurse \"I can't have any medicated ointments, they cause inflammation.\" Pt unable to specify which medicines or ointments cause inflammation. Pt expressed he needs to speak with MICHELLE Barker and has nothing else further to discuss with SMITHA Hickey. MICHELLE Barker notified.

## 2024-10-08 ENCOUNTER — APPOINTMENT (OUTPATIENT)
Facility: HOSPITAL | Age: 79
End: 2024-10-08
Payer: MEDICARE

## 2024-10-08 ENCOUNTER — HOSPITAL ENCOUNTER (EMERGENCY)
Facility: HOSPITAL | Age: 79
Discharge: HOME | End: 2024-10-08
Attending: EMERGENCY MEDICINE
Payer: MEDICARE

## 2024-10-08 VITALS
SYSTOLIC BLOOD PRESSURE: 140 MMHG | OXYGEN SATURATION: 97 % | BODY MASS INDEX: 23.13 KG/M2 | HEIGHT: 70 IN | TEMPERATURE: 97.7 F | HEART RATE: 93 BPM | WEIGHT: 161.6 LBS | RESPIRATION RATE: 17 BRPM | DIASTOLIC BLOOD PRESSURE: 79 MMHG

## 2024-10-08 DIAGNOSIS — S22.31XA CLOSED FRACTURE OF ONE RIB OF RIGHT SIDE, INITIAL ENCOUNTER: Primary | ICD-10-CM

## 2024-10-08 PROCEDURE — 6370000000 HC RX 637 (ALT 250 FOR IP): Performed by: EMERGENCY MEDICINE

## 2024-10-08 PROCEDURE — 99283 EMERGENCY DEPT VISIT LOW MDM: CPT

## 2024-10-08 PROCEDURE — 73030 X-RAY EXAM OF SHOULDER: CPT

## 2024-10-08 PROCEDURE — 71101 X-RAY EXAM UNILAT RIBS/CHEST: CPT

## 2024-10-08 RX ORDER — LIDOCAINE 4 G/G
1 PATCH TOPICAL
Status: DISCONTINUED | OUTPATIENT
Start: 2024-10-08 | End: 2024-10-08 | Stop reason: HOSPADM

## 2024-10-08 RX ORDER — HYDROMORPHONE HYDROCHLORIDE 2 MG/1
2 TABLET ORAL EVERY 6 HOURS PRN
Qty: 12 TABLET | Refills: 0 | Status: SHIPPED | OUTPATIENT
Start: 2024-10-08 | End: 2024-10-11

## 2024-10-08 RX ORDER — ACETAMINOPHEN 500 MG
1000 TABLET ORAL
Status: COMPLETED | OUTPATIENT
Start: 2024-10-08 | End: 2024-10-08

## 2024-10-08 RX ADMIN — ACETAMINOPHEN 1000 MG: 500 TABLET ORAL at 13:47

## 2024-10-08 ASSESSMENT — PAIN SCALES - GENERAL
PAINLEVEL_OUTOF10: 8
PAINLEVEL_OUTOF10: 8

## 2024-10-08 ASSESSMENT — PAIN DESCRIPTION - DESCRIPTORS: DESCRIPTORS: ACHING

## 2024-10-08 ASSESSMENT — PAIN DESCRIPTION - PAIN TYPE: TYPE: ACUTE PAIN

## 2024-10-08 ASSESSMENT — PAIN DESCRIPTION - LOCATION: LOCATION: BACK

## 2024-10-08 ASSESSMENT — PAIN DESCRIPTION - ORIENTATION: ORIENTATION: LOWER

## 2024-10-08 ASSESSMENT — PAIN - FUNCTIONAL ASSESSMENT: PAIN_FUNCTIONAL_ASSESSMENT: 0-10

## 2024-10-08 NOTE — ED PROVIDER NOTES
1. Closed fracture of one rib of right side, initial encounter          DISPOSITION/PLAN   Khurram Allen's  results have been reviewed with him.  He has been counseled regarding his diagnosis, treatment, and plan.  He verbally conveys understanding and agreement of the signs, symptoms, diagnosis, treatment and prognosis and additionally agrees to follow up as discussed.  He also agrees with the care-plan and conveys that all of his questions have been answered.  I have also provided discharge instructions for him that include: educational information regarding their diagnosis and treatment, and list of reasons why they would want to return to the ED prior to their follow-up appointment, should his condition change.     CLINICAL IMPRESSION    Discharge Note: The patient is stable for discharge home. The signs, symptoms, diagnosis, and discharge instructions have been discussed, understanding conveyed, and agreed upon. The patient is to follow up as recommended or return to ER should their symptoms worsen.      PATIENT REFERRED TO:  No follow-up provider specified.     DISCHARGE MEDICATIONS:     Medication List        START taking these medications      HYDROmorphone 2 MG tablet  Commonly known as: Dilaudid  Take 1 tablet by mouth every 6 hours as needed for Pain for up to 3 days. Max Daily Amount: 8 mg            ASK your doctor about these medications      lisinopril 20 MG tablet  Commonly known as: PRINIVIL;ZESTRIL     loratadine 10 MG capsule  Commonly known as: CLARITIN     Lutein 20 MG Tabs     magnesium oxide 400 MG tablet  Commonly known as: MAG-OX     mupirocin 2 % ointment  Commonly known as: BACTROBAN  Apply topically 3 times daily.     naproxen sodium 220 MG tablet  Commonly known as: ANAPROX               Where to Get Your Medications        These medications were sent to [id number] [place], [place] - [address] - P [phone] - F [phone],

## 2024-10-08 NOTE — DISCHARGE INSTRUCTIONS
Lidocaine patches can be purchased over-the-counter on 12 hours off 12 hours.    May also want to consider diclofenac gel which can also be purchased over-the-counter topically applied 3 times a day.    All narcotic pain medications have the potential to cause constipation.  Would recommend daily MiraLAX use while using pain medications increase frequency as needed to keep stools soft and moving.    10 deep breaths every hour while awake for the next several days to help prevent pneumonia

## 2024-10-22 ENCOUNTER — HOSPITAL ENCOUNTER (OUTPATIENT)
Facility: HOSPITAL | Age: 79
Discharge: HOME | End: 2024-10-25
Attending: INTERNAL MEDICINE
Payer: MEDICARE

## 2024-10-22 DIAGNOSIS — R93.89 ABNORMAL COMPUTERIZED AXIAL TOMOGRAPHY OF CHEST: ICD-10-CM

## 2024-10-22 PROCEDURE — 71250 CT THORAX DX C-: CPT

## 2025-04-16 ENCOUNTER — TRANSCRIBE ORDERS (OUTPATIENT)
Facility: HOSPITAL | Age: 80
End: 2025-04-16

## 2025-04-16 DIAGNOSIS — R91.1 PULMONARY NODULE: Primary | ICD-10-CM

## 2025-04-24 ENCOUNTER — HOSPITAL ENCOUNTER (OUTPATIENT)
Facility: HOSPITAL | Age: 80
Discharge: HOME | End: 2025-04-27
Attending: INTERNAL MEDICINE
Payer: MEDICARE

## 2025-04-24 DIAGNOSIS — R91.1 PULMONARY NODULE: ICD-10-CM

## 2025-04-24 PROCEDURE — 71250 CT THORAX DX C-: CPT

## (undated) DEVICE — SYR IRR BLB 2OZ DISP BLU STRL -- CONVERT TO ITEM 357637

## (undated) DEVICE — TRAY CATH 16F DRN BG LTX -- CONVERT TO ITEM 363158

## (undated) DEVICE — STERILE POLYISOPRENE POWDER-FREE SURGICAL GLOVES: Brand: PROTEXIS

## (undated) DEVICE — MEGA SUTURECUT ND: Brand: ENDOWRIST

## (undated) DEVICE — HAND I-LF: Brand: MEDLINE INDUSTRIES, INC.

## (undated) DEVICE — HANDLE LT SNAP ON ULT DURABLE LENS FOR TRUMPF ALC DISPOSABLE

## (undated) DEVICE — ARM DRAPE

## (undated) DEVICE — DEVON™ KNEE AND BODY STRAP 60" X 3" (1.5 M X 7.6 CM): Brand: DEVON

## (undated) DEVICE — MEGA NEEDLE DRIVER: Brand: ENDOWRIST

## (undated) DEVICE — BIPOLAR FORCEPS CORD: Brand: VALLEYLAB

## (undated) DEVICE — SUTURE VCRL SZ 3-0 L27IN ABSRB UD L26MM SH 1/2 CIR J416H

## (undated) DEVICE — BLADELESS OPTICAL TROCAR WITH FIXATION CANNULA: Brand: VERSAPORT

## (undated) DEVICE — SOLUTION LACTATED RINGERS INJECTION USP

## (undated) DEVICE — Z DISCONTINUED PER MEDLINE (LOW STOCK)  USE 2422770 DRAPE C ARM W54XL78IN FOR FLROSCN

## (undated) DEVICE — VESSEL LOOPS,MAXI, BLUE: Brand: DEVON

## (undated) DEVICE — KENDALL DL ECG CABLE AND LEAD WIRE SYSTEM, 3-LEAD, SINGLE PATIENT USE: Brand: KENDALL

## (undated) DEVICE — BANDAGE COMPR W4INXL5YD BGE COHESIVE SELF ADH ADBAN CBN1104] AVCOR HEALTHCARE PRODUCTS INC]

## (undated) DEVICE — SURGICAL PROCEDURE KIT GEN LAPAROSCOPY LF

## (undated) DEVICE — PADDING CAST 4 INX5 YD STRL

## (undated) DEVICE — LIGHT HANDLE: Brand: DEVON

## (undated) DEVICE — ZIMMER® STERILE DISPOSABLE TOURNIQUET CUFF WITH PLC, DUAL PORT, SINGLE BLADDER, 18 IN. (46 CM)

## (undated) DEVICE — BLADELESS OBTURATOR: Brand: WECK VISTA

## (undated) DEVICE — COVER,MAYO STAND,STERILE: Brand: MEDLINE

## (undated) DEVICE — KERLIX BANDAGE ROLL: Brand: KERLIX

## (undated) DEVICE — OCCLUSIVE GAUZE STRIP,3% BISMUTH TRIBROMOPHENATE IN PETROLATUM BLEND: Brand: XEROFORM

## (undated) DEVICE — (D)PREP SKN CHLRAPRP APPL 26ML -- CONVERT TO ITEM 371833

## (undated) DEVICE — SUTURE V LOC 1 L18IN NONABSORBABLE GS-21 TAPERPOINT NDL VLOCN0327

## (undated) DEVICE — CONTINU-FLO SOLUTION SET, 2 INJECTION SITES, MALE LUER LOCK ADAPTER WITH RETRACTABLE COLLAR, LARGE BORE STOPCOCK WITH ROTATING MALE LUER LOCK EXTENSION SET, 2 INJECTION SITES, MALE LUER LOCK ADAPTER WITH RETRACTABLE COLLAR: Brand: INTERLINK/CONTINU-FLO

## (undated) DEVICE — KENDALL SCD EXPRESS SLEEVES, KNEE LENGTH, MEDIUM: Brand: KENDALL SCD

## (undated) DEVICE — SUTURE SZ 0 27IN 5/8 CIR UR-6  TAPER PT VIOLET ABSRB VICRYL J603H

## (undated) DEVICE — TIP COVER ACCESSORY

## (undated) DEVICE — VISUALIZATION SYSTEM: Brand: CLEARIFY

## (undated) DEVICE — SUTURE V-LOC 180 SZ 0 L12IN ABSRB GRN L37MM GS-21 1/2 CIR VLOCL0316

## (undated) DEVICE — DILATOR AND CANNULA WITH RADIALLY EXPANDABLE SLEEVE: Brand: VERSASTEP PLUS

## (undated) DEVICE — BLADE OPHTH MINI BEAV SHRP --

## (undated) DEVICE — REM POLYHESIVE ADULT PATIENT RETURN ELECTRODE: Brand: VALLEYLAB

## (undated) DEVICE — SUTURE FIBERLOOP SZ 2-0 L24IN NONABSORBABLE BLU L26.2MM 3/8 AR723202

## (undated) DEVICE — SOLUTION IV 1000ML 0.9% SOD CHL

## (undated) DEVICE — SUTURE ETHLN SZ 4-0 L18IN NONABSORBABLE BLK L19MM PS-2 3/8 1667H

## (undated) DEVICE — COLUMN DRAPE

## (undated) DEVICE — INTENDED FOR TISSUE SEPARATION, AND OTHER PROCEDURES THAT REQUIRE A SHARP SURGICAL BLADE TO PUNCTURE OR CUT.: Brand: BARD-PARKER ® CARBON RIB-BACK BLADES

## (undated) DEVICE — BINDER ABD M/L H12IN FOR 46-62IN WHT 4 SLD PNL DSGN HOOP

## (undated) DEVICE — SEAL UNIV 5-8MM DISP BX/10 -- DA VINCI XI - SNGL USE

## (undated) DEVICE — APPLICATOR BNDG 1MM ADH PREMIERPRO EXOFIN

## (undated) DEVICE — TOWEL SURG W17XL27IN STD BLU COT NONFENESTRATED PREWASHED

## (undated) DEVICE — ELECTRO LUBE IS A SINGLE PATIENT USE DEVICE THAT IS INTENDED TO BE USED ON ELECTROSURGICAL ELECTRODES TO REDUCE STICKING.: Brand: KEY SURGICAL ELECTRO LUBE

## (undated) DEVICE — INFECTION CONTROL KIT SYS

## (undated) DEVICE — DRAPE,REIN 53X77,STERILE: Brand: MEDLINE

## (undated) DEVICE — SOLUTION IRRIG 1000ML H2O STRL BLT

## (undated) DEVICE — BLADE ASSEMB CLP HAIR FINE --

## (undated) DEVICE — MAGNETIC IMPLANT S1000-00: Brand: SOPHONO™

## (undated) DEVICE — 3M™ TEGADERM™ TRANSPARENT FILM DRESSING FRAME STYLE, 1624W, 2-3/8 IN X 2-3/4 IN (6 CM X 7 CM), 100/CT 4CT/CASE: Brand: 3M™ TEGADERM™

## (undated) DEVICE — NEEDLE HYPO 25GA L1.5IN BVL ORIENTED ECLIPSE

## (undated) DEVICE — GOWN,SIRUS,NONRNF,SETINSLV,2XL,18/CS: Brand: MEDLINE

## (undated) DEVICE — SUTURE VCRL SZ 4-0 L27IN ABSRB UD L19MM PS-2 3/8 CIR PRIM J426H

## (undated) DEVICE — STERILE SLEEVE: Brand: CONVERTORS